# Patient Record
Sex: FEMALE | Race: OTHER | HISPANIC OR LATINO | URBAN - METROPOLITAN AREA
[De-identification: names, ages, dates, MRNs, and addresses within clinical notes are randomized per-mention and may not be internally consistent; named-entity substitution may affect disease eponyms.]

---

## 2023-01-14 ENCOUNTER — EMERGENCY (EMERGENCY)
Facility: HOSPITAL | Age: 23
LOS: 1 days | Discharge: HOME | End: 2023-01-14
Attending: PERSONAL EMERGENCY RESPONSE ATTENDANT
Payer: SELF-PAY

## 2023-01-14 VITALS
HEIGHT: 62 IN | DIASTOLIC BLOOD PRESSURE: 95 MMHG | OXYGEN SATURATION: 96 % | RESPIRATION RATE: 20 BRPM | TEMPERATURE: 98 F | WEIGHT: 160.06 LBS | SYSTOLIC BLOOD PRESSURE: 140 MMHG | HEART RATE: 114 BPM

## 2023-01-14 LAB
ALBUMIN SERPL ELPH-MCNC: 4.7 G/DL — SIGNIFICANT CHANGE UP (ref 3.3–5)
ALP SERPL-CCNC: 70 U/L — SIGNIFICANT CHANGE UP (ref 40–120)
ALT FLD-CCNC: 9 U/L — LOW (ref 10–45)
AMPHET UR-MCNC: NEGATIVE — SIGNIFICANT CHANGE UP
ANION GAP SERPL CALC-SCNC: 18 MMOL/L — HIGH (ref 5–17)
APAP SERPL-MCNC: <15 UG/ML — SIGNIFICANT CHANGE UP (ref 10–30)
APPEARANCE UR: ABNORMAL
AST SERPL-CCNC: 27 U/L — SIGNIFICANT CHANGE UP (ref 10–40)
BACTERIA # UR AUTO: ABNORMAL
BARBITURATES UR SCN-MCNC: NEGATIVE — SIGNIFICANT CHANGE UP
BASOPHILS # BLD AUTO: 0.02 K/UL — SIGNIFICANT CHANGE UP (ref 0–0.2)
BASOPHILS NFR BLD AUTO: 0.2 % — SIGNIFICANT CHANGE UP (ref 0–2)
BENZODIAZ UR-MCNC: NEGATIVE — SIGNIFICANT CHANGE UP
BILIRUB SERPL-MCNC: 0.3 MG/DL — SIGNIFICANT CHANGE UP (ref 0.2–1.2)
BILIRUB UR-MCNC: NEGATIVE — SIGNIFICANT CHANGE UP
BUN SERPL-MCNC: 11 MG/DL — SIGNIFICANT CHANGE UP (ref 7–23)
CALCIUM SERPL-MCNC: 9.8 MG/DL — SIGNIFICANT CHANGE UP (ref 8.4–10.5)
CHLORIDE SERPL-SCNC: 101 MMOL/L — SIGNIFICANT CHANGE UP (ref 96–108)
CO2 SERPL-SCNC: 15 MMOL/L — LOW (ref 22–31)
COCAINE METAB.OTHER UR-MCNC: NEGATIVE — SIGNIFICANT CHANGE UP
COLOR SPEC: YELLOW — SIGNIFICANT CHANGE UP
CREAT SERPL-MCNC: 0.59 MG/DL — SIGNIFICANT CHANGE UP (ref 0.5–1.3)
DIFF PNL FLD: ABNORMAL
EGFR: 131 ML/MIN/1.73M2 — SIGNIFICANT CHANGE UP
EOSINOPHIL # BLD AUTO: 0.01 K/UL — SIGNIFICANT CHANGE UP (ref 0–0.5)
EOSINOPHIL NFR BLD AUTO: 0.1 % — SIGNIFICANT CHANGE UP (ref 0–6)
EPI CELLS # UR: 7 /HPF — HIGH
ETHANOL SERPL-MCNC: <10 MG/DL — SIGNIFICANT CHANGE UP (ref 0–10)
GLUCOSE SERPL-MCNC: 96 MG/DL — SIGNIFICANT CHANGE UP (ref 70–99)
GLUCOSE UR QL: NEGATIVE — SIGNIFICANT CHANGE UP
HCG SERPL-ACNC: <2 MIU/ML — SIGNIFICANT CHANGE UP
HCT VFR BLD CALC: 42.9 % — SIGNIFICANT CHANGE UP (ref 34.5–45)
HGB BLD-MCNC: 14.4 G/DL — SIGNIFICANT CHANGE UP (ref 11.5–15.5)
HYALINE CASTS # UR AUTO: 12 /LPF — HIGH (ref 0–2)
IMM GRANULOCYTES NFR BLD AUTO: 0.2 % — SIGNIFICANT CHANGE UP (ref 0–0.9)
KETONES UR-MCNC: ABNORMAL
LEUKOCYTE ESTERASE UR-ACNC: ABNORMAL
LYMPHOCYTES # BLD AUTO: 25.8 % — SIGNIFICANT CHANGE UP (ref 13–44)
LYMPHOCYTES # BLD AUTO: 3.24 K/UL — SIGNIFICANT CHANGE UP (ref 1–3.3)
MAGNESIUM SERPL-MCNC: 2.1 MG/DL — SIGNIFICANT CHANGE UP (ref 1.6–2.6)
MCHC RBC-ENTMCNC: 28.6 PG — SIGNIFICANT CHANGE UP (ref 27–34)
MCHC RBC-ENTMCNC: 33.6 GM/DL — SIGNIFICANT CHANGE UP (ref 32–36)
MCV RBC AUTO: 85.3 FL — SIGNIFICANT CHANGE UP (ref 80–100)
METHADONE UR-MCNC: NEGATIVE — SIGNIFICANT CHANGE UP
MONOCYTES # BLD AUTO: 0.66 K/UL — SIGNIFICANT CHANGE UP (ref 0–0.9)
MONOCYTES NFR BLD AUTO: 5.3 % — SIGNIFICANT CHANGE UP (ref 2–14)
NEUTROPHILS # BLD AUTO: 8.6 K/UL — HIGH (ref 1.8–7.4)
NEUTROPHILS NFR BLD AUTO: 68.4 % — SIGNIFICANT CHANGE UP (ref 43–77)
NITRITE UR-MCNC: NEGATIVE — SIGNIFICANT CHANGE UP
NRBC # BLD: 0 /100 WBCS — SIGNIFICANT CHANGE UP (ref 0–0)
OPIATES UR-MCNC: NEGATIVE — SIGNIFICANT CHANGE UP
OXYCODONE UR-MCNC: NEGATIVE — SIGNIFICANT CHANGE UP
PCP SPEC-MCNC: SIGNIFICANT CHANGE UP
PCP UR-MCNC: NEGATIVE — SIGNIFICANT CHANGE UP
PH UR: 5.5 — SIGNIFICANT CHANGE UP (ref 5–8)
PLATELET # BLD AUTO: 304 K/UL — SIGNIFICANT CHANGE UP (ref 150–400)
POTASSIUM SERPL-MCNC: 4.1 MMOL/L — SIGNIFICANT CHANGE UP (ref 3.5–5.3)
POTASSIUM SERPL-SCNC: 4.1 MMOL/L — SIGNIFICANT CHANGE UP (ref 3.5–5.3)
PROT SERPL-MCNC: 8.1 G/DL — SIGNIFICANT CHANGE UP (ref 6–8.3)
PROT UR-MCNC: ABNORMAL
RBC # BLD: 5.03 M/UL — SIGNIFICANT CHANGE UP (ref 3.8–5.2)
RBC # FLD: 12.3 % — SIGNIFICANT CHANGE UP (ref 10.3–14.5)
RBC CASTS # UR COMP ASSIST: 13 /HPF — HIGH (ref 0–4)
SALICYLATES SERPL-MCNC: <2 MG/DL — LOW (ref 15–30)
SODIUM SERPL-SCNC: 134 MMOL/L — LOW (ref 135–145)
SP GR SPEC: 1.02 — SIGNIFICANT CHANGE UP (ref 1.01–1.02)
THC UR QL: POSITIVE
UROBILINOGEN FLD QL: NEGATIVE — SIGNIFICANT CHANGE UP
WBC # BLD: 12.56 K/UL — HIGH (ref 3.8–10.5)
WBC # FLD AUTO: 12.56 K/UL — HIGH (ref 3.8–10.5)
WBC UR QL: 83 /HPF — HIGH (ref 0–5)

## 2023-01-14 PROCEDURE — 99212 OFFICE O/P EST SF 10 MIN: CPT

## 2023-01-14 PROCEDURE — 99285 EMERGENCY DEPT VISIT HI MDM: CPT

## 2023-01-14 RX ORDER — DIPHENHYDRAMINE HCL 50 MG
50 CAPSULE ORAL ONCE
Refills: 0 | Status: COMPLETED | OUTPATIENT
Start: 2023-01-14 | End: 2023-01-14

## 2023-01-14 RX ORDER — HALOPERIDOL DECANOATE 100 MG/ML
5 INJECTION INTRAMUSCULAR ONCE
Refills: 0 | Status: COMPLETED | OUTPATIENT
Start: 2023-01-14 | End: 2023-01-14

## 2023-01-14 RX ADMIN — Medication 2 MILLIGRAM(S): at 21:18

## 2023-01-14 RX ADMIN — HALOPERIDOL DECANOATE 5 MILLIGRAM(S): 100 INJECTION INTRAMUSCULAR at 21:18

## 2023-01-14 NOTE — ED PROVIDER NOTE - OBJECTIVE STATEMENT
Attending MD Magallanes.  Agree with above.  PT is a 21 yo fem presenting to ED stating that god brought her to ED.  Pt endorses being followed by 'people'.  Pt refusing to answer questions in ED initially then splitting and willing to answer certain staff.  Becomes easily paranoid and accuses us of being in on it.  Pt with bizarre responses to questions, occasionally giving numeric numbers to questions that do not warrant numeric responses.  Pt also standing in doorway of room making racist/aggressive comments to passersby and accusing people of ignoring her who have not seen her.  Unclear baseline dxes.  Pt endorses previous (psych?) admission to Attending MD Magallanes.  Agree with above.  PT is a 23 yo fem presenting to ED stating that god brought her to ED.  Pt endorses being followed by 'people'.  Pt refusing to answer questions in ED initially then splitting and willing to answer certain staff.  Becomes easily paranoid and accuses us of being in on it.  Pt with bizarre responses to questions, occasionally giving numeric numbers to questions that do not warrant numeric responses.  Pt also standing in doorway of room making racist/aggressive comments to passersby and accusing people of ignoring her who have not seen her.  Unclear baseline dxes.  Pt endorses previous (psych?) admission to [facility] in 2018 but denies hx since that time.  Denies taking routine meds, denies recent desire for self harm/SI.  Denies past hx of self-harm attempts/SI/HI.  Pt is disorganized, paranoid and bizzarre with perseveration re: persecution by everyone she encounters.

## 2023-01-14 NOTE — ED ADULT TRIAGE NOTE - CHIEF COMPLAINT QUOTE
As per cousin pt is hearing voices, talks to herself a lot and been aggressive a lot at home, no SI or HI

## 2023-01-14 NOTE — ED PROVIDER NOTE - CLINICAL SUMMARY MEDICAL DECISION MAKING FREE TEXT BOX
Attending MD Magallanes.  Agree with above.  Initial documentation completed by me with resident and myself providing care in real time.  Pt presents with complaint of 'not feeling safe' and needing to 'save the world' because she 'loves the earth'.  States she loves 'animals and plants'.  Pt unwilling to sit still/allow labs or communicate. Pt pacing.  Ativan/haldol administered to promote pt cooperation for pt and staff safety.  Pt tolerated well.  Labs drawn.  Planned psych consult once alcohol level returns.  Stable at time of signout pending above. Attending MD Magallanes.  Agree with above.  Initial documentation completed by me with resident and myself providing care in real time.  Pt presents with complaint of 'not feeling safe' and needing to 'save the world' because she 'loves the earth'.  States she loves 'animals and plants'.  Pt unwilling to sit still/allow labs or communicate. Pt pacing.  Ativan/haldol administered to promote pt cooperation for pt and staff safety.  Pt tolerated well.  Labs drawn.  Planned psych consult once alcohol level returns.  Stable at time of sign-out pending above.

## 2023-01-14 NOTE — ED PROVIDER NOTE - ATTENDING CONTRIBUTION TO CARE
Attending MD Magallanes:  I performed a history and physical exam of the patient and discussed their management with the resident. I reviewed the resident's note and agree with the documented findings and plan of care. My medical decision making and observations are found above.
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
## 2023-01-14 NOTE — ED ADULT NURSE NOTE - ACTIVATING EVENTS/STRESSORS
Patient Education     Viral Syndrome (Adult)  A viral illness may cause a number of symptoms such as fever. Other symptoms depend on the part of the body that the virus affects. If it settles in your nose, throat, and lungs, it may cause cough, sore throat, congestion, runny nose, headache, earache and other ear symptoms, or shortness of breath. If it settles in your stomach and intestinal tract, it may cause nausea, vomiting, cramping, and diarrhea. Sometimes it causes generalized symptoms like \"aching all over,\" feeling tired, loss of energy, or loss of appetite.  A viral illness usually lasts anywhere from several days to several weeks, but sometimes it lasts longer. In some cases, a more serious infection can look like a viral syndrome in the first few days of the illness. You may need another exam and additional tests to know the difference. Watch for the warning signs listed below for when to seek medical advice.  Home care  Follow these guidelines for taking care of yourself at home:  · If symptoms are severe, rest at home for the first 2 to 3 days.  · Stay away from cigarette smoke - both your smoke and the smoke from others.  · You may use over-the-counter acetaminophen or ibuprofen for fever, muscle aching, and headache, unless another medicine was prescribed for this. If you have chronic liver or kidney disease or ever had a stomach ulcer or gastrointestinal bleeding, talk with your healthcare provider before using these medicines. No one who is younger than 18 and ill with a fever should take aspirin. It may cause severe disease or death.  · Your appetite may be poor, so a light diet is fine. Avoid dehydration by drinking 8 to 12, 8-ounce glasses of fluids each day. This may include water; orange juice; lemonade; apple, grape, and cranberry juice; clear fruit drinks; electrolyte replacement and sports drinks; and decaffeinated teas and coffee. If you have been diagnosed with a kidney disease, ask your  healthcare provider how much and what types of fluids you should drink to prevent dehydration. If you have kidney disease, drinking too much fluid can cause it build up in the your body and be dangerous to your health.  · Over-the-counter remedies won't shorten the length of the illness but may be helpful for symptoms such as cough, sore throat, nasal and sinus congestion, or diarrhea. Don't use decongestants if you have high blood pressure.  Follow-up care  Follow up with your healthcare provider if you do not improve over the next week.  Call 911  Call 911 if any of the following occur:  · Convulsion  · Feeling weak, dizzy, or like you are going to faint  · Chest pain, or more than mild shortness of breath   When to seek medical advice  Call your healthcare provider right away if any of these occur:  · Cough with lots of colored sputum (mucus) or blood in your sputum  · Chest pain, shortness of breath, wheezing, or trouble breathing  · Severe headache; face, neck, or ear pain  · Severe, constant pain in the lower right side of your belly (abdominal)  · Continued vomiting (can’t keep liquids down)  · Frequent diarrhea (more than 5 times a day); blood (red or black color) or mucus in diarrhea  · Feeling weak, dizzy, or like you are going to faint  · Extreme thirst  · Fever of 100.4°F (38°C) or higher, or as directed by your healthcare provider  Date Last Reviewed: 4/1/2018  © 2442-1956 [employer]. [address] [place] [address]. All rights reserved. This information is not intended as a substitute for professional medical care. Always follow your healthcare professional's instructions.         Brat Diet    Treatment of nausea, vomiting and/or diarrhea      Nausea is a sick queasy feeling in the stomach.  When you are nauseated you may feel like you have to vomit or have actual vomiting of foodstuff contents of the gastrointestinal system.  They are symptoms of some underlying process  frequently related to diseases of the gastrointestinal system, which may be caused by viruses, food poisoning, medications, alcohol, anxiety and pregnancy.  In addition, nausea may be a sign of an upper respiratory illness with a post-nasal drip.    Diarrhea is a symptom of gastrointestinal disease resulting in loose, watery often frequent bowel movements.  It may be acute, beginning suddenly and resolving over a few days with dietary changes, or of a chronic ongoing process.  Causes of this symptom are similar to the ones listed for nausea and vomiting.    BRAT stands for Bananas, Rice, Apples and Toast.    Treatment:  A short-term gastrointestinal (stomach or bowel) illness requires a change in your diet.    For Nausea and/or vomiting:  First 24 hours: (Day One) Gradually add clear liquids if the vomiting has ceased.  Beginning with a sip or two every ten minutes is a good way to start.  Suggestions include Gatorade, Propel, Pedialyte, water, apple juice, flat soda, weak tea, jello (in liquid or gelatin form), broth or bouillon (Clear based from non-greasy soup).  If symptoms of nausea or vomiting return, begin the process again, taking nothing by mouth for an hour or so.    (Day Two) - Begin to add bland foods like bananas, rice applesauce, cracker, cooked cereals, ([place], Cream of Wheat), toast and jelly.    (Day Three) - Progress to add \"regular\" diet by adding such things as soft cooked eggs, sherbet, stewed fruits , cooked vegetables, white meat of chicken or turkey.    What foods to avoid:  Avoid milk and dairy products for 7 days.  Avoid fried, fatty, greasy and spicy foods.  Avoid pork, veal, salmon and sardines.  Avoid raw vegetables such as parsnips, beets, sauerkraut, corn on the cob, cabbage family, onions.  Avoid citrus fruits:  Pineapples, oranges, grapefruits, tomatoes.  Other fruits to avoid are cherries, grapes, figs, currants, raisins, rhubarb, seeded berries.  Avoid extremely hot or cold  beverages.  Avoid alcohol.  Avoid coffee and caffeinated sodas.  Drink plenty of water or liquids to avoid dehydration from fluid losses due to your illness.    Rest and avoid exertion to give your body a chance to recover.    Make an appointment if you are not getting better with the diet changes after 24 hours, if you have a problem with chronic diarrhea or if you have additional symptoms of fever, weight loss, lightheadedness (feeling of faintness), rectal bleeding or abdominal pain.       Non-compliant or not receiving treatment

## 2023-01-14 NOTE — ED PROVIDER NOTE - CARE PLAN
1 Principal Discharge DX:	Psychiatric disturbance   Principal Discharge DX:	Psychiatric disturbance  Secondary Diagnosis:	Acute UTI

## 2023-01-14 NOTE — ED PROVIDER NOTE - PROGRESS NOTE DETAILS
Jurgen Haider MD (PGY-3): Pt has been calm and cooperative since receiving haldol and ativan. Spoke to tele-psych, who will see pt. u/a shows that pt has Jurgen Haider MD (PGY-3): Pt has been calm and cooperative since receiving haldol and ativan. Spoke to tele-psych, who will see pt. u/a shows that pt has UTI, so pt was given PO keflex. Sanjiv Ferreira MD: seen by Tele-psych, plan for 2-PC psychiatric admission. pt reassessed and sleeping comfortably, awakens to voice, nonfocal neuro exam. pt found to have UTI, given PO atbx. Martinez Rosado MD: Final disposition as per psychiatry's for inpatient involuntary hold.  We will complete 2 PC paperwork. Jony Collins, PGY-3: Pt reexamined at bedside, resting comfortably, vitals stable. Patient has been calm and cooperative while in the emergency department during my shift.  As per psychiatry patient will be admitted to hospital, bed may be available at [facility].  This was discussed with mother over the phone.  Mother is in agreement. FRANKLIN Valadez MD: Rec'd signout on this patient from previous attending. Pt has been 2PC-ed, has not had any acute events during my shift, is awaiting psychiatric placement. Attending (Walt Black D.O.):  Patient was signed out to me hemodynamically stable.  No events overnight that required medication.  Patient this morning, after discussion with primary RN as well as one-to-one, with increasing agitation, pacing, delusions/hallucinations.  Patient was verbally threatening behavior to staff.  Offered p.o. meds however declined.  Will provide intramuscular medication for agitated anxiety control at this time ap- pt signed out to me is currently ambulatory in the department w/ steady gait, pending bed placement remains on constant observation, 2PC for psychosis, paranoia labile mood, UA rx'd w/ keflex, will continue medication and to start risperidal per psych recs Mauricio Todd PGY-3 Patient signed out to me currently waiting for bed placement to PCU for psychotic behavior has not needed any sedative meds overnight.  has been sleeping during my shift ap- pt signed out to me pending placement continues to be on a 2PC, for psychosis, paranoias and labile mood, pt continueing keflex, she has no complaints resting in bed currently, on one to one observation, denies any ah/vh/si/hi  Arsenio: Recd sign out from Dr. Gerardo, pt p/w delusions, 2PC paperwork filled out, awaiting psych bed

## 2023-01-15 DIAGNOSIS — F29 UNSPECIFIED PSYCHOSIS NOT DUE TO A SUBSTANCE OR KNOWN PHYSIOLOGICAL CONDITION: ICD-10-CM

## 2023-01-15 DIAGNOSIS — F43.10 POST-TRAUMATIC STRESS DISORDER, UNSPECIFIED: ICD-10-CM

## 2023-01-15 DIAGNOSIS — F12.90 CANNABIS USE, UNSPECIFIED, UNCOMPLICATED: ICD-10-CM

## 2023-01-15 LAB
FLUAV AG NPH QL: SIGNIFICANT CHANGE UP
FLUBV AG NPH QL: SIGNIFICANT CHANGE UP
RSV RNA NPH QL NAA+NON-PROBE: SIGNIFICANT CHANGE UP
SARS-COV-2 RNA SPEC QL NAA+PROBE: SIGNIFICANT CHANGE UP
TSH SERPL-MCNC: 2.08 UIU/ML — SIGNIFICANT CHANGE UP (ref 0.27–4.2)

## 2023-01-15 PROCEDURE — 90792 PSYCH DIAG EVAL W/MED SRVCS: CPT | Mod: 95

## 2023-01-15 RX ORDER — CEPHALEXIN 500 MG
250 CAPSULE ORAL ONCE
Refills: 0 | Status: COMPLETED | OUTPATIENT
Start: 2023-01-15 | End: 2023-01-15

## 2023-01-15 RX ADMIN — Medication 250 MILLIGRAM(S): at 03:39

## 2023-01-15 NOTE — ED BEHAVIORAL HEALTH ASSESSMENT NOTE - DIFFERENTIAL
Psychosis, unspecified vs. BPAD with psychotic features  Cannabis Use Disorder  r/o complex PTSD  r/o substance induced psychosis

## 2023-01-15 NOTE — CHART NOTE - NSCHARTNOTEFT_GEN_A_CORE
Weekend ED  received and appreciated referral to patient for inpatient psychiatry placement.  discussed case with psychiatry. Patient is 2PC'd for inpatient psychiatry placement for paranoia and agitation.      met with patient at bedside. Patient is a 22 year old English speaking female. Patient resides with her aunt, Mere Garduno (p: [phone]), her brother, and her cousin. Patient appointed her aunt as her best emergency contact. Patient confirmed she is uninsured and eligible only for Medicaid for emergency services at present time.  informed Adrien from financial counseling of the same.     initiated packet for inpatient psychiatry referral and contacted Janet at [facility] (no beds), Pat at [facility] (no beds), Bob at [facility] (no beds), Olivia at [facility] (no beds), Taylor at [facility] (will review packet for possible bed tomorrow), and Atiya at [facility] (will review packet for possible bed tomorrow).  e-faxed referrals to [facility] and [facility]  to follow up for acceptance and placement.      remains available for support and discharge planning needs/ placement.
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
## 2023-01-15 NOTE — ED BEHAVIORAL HEALTH PROGRESS NOTE - RISK ASSESSMENT
risk: prior hospitalization, paranoia with persecutory elements, aggressive behavior, no outpatient treatment, poor insight and judgment  protective: family support risk: prior hospitalization, paranoia with persecutory elements, aggressive behavior, no outpatient treatment, poor insight and judgment, trauma history  protective: family support, no prior history of suicide attempt, no history of violence

## 2023-01-15 NOTE — ED BEHAVIORAL HEALTH NOTE - BEHAVIORAL HEALTH NOTE
==================  PRE-HOSPITAL COURSE  ===================  SOURCE:  RN and secondhand ED documentation  DETAILS: PT is a 21 yo fem presenting to ED stating that god brought her to ED.  Pt endorses being followed by 'people'.  Pt refusing to answer questions in ED initially then splitting and willing to answer certain staff.  =========  ED COURSE  =========  SOURCE:  RN Leopoldo and secondhand ED documentation.  ARRIVAL:  Per chart and RN, patient arrived via walk in accompanied by cousin. Per RN, patient was irritable upon arrival, and cooperative with triage process.  BELONGINGS:  Per RN, patient arrived with belongings. All belongings were provided to hospital security, and patient currently in a gown with a 1:1 staff member.  BEHAVIOR: RN described patient to be labile, paranoid, verbally aggressive, screaming at staff, yelling racial slurs at , required medication, now calm and cooperative, presenting with disorganized thought process, AAOx3, presenting with irritable mood and congruent affect, remains in tenuous behavioral and impulse control, is not currently violent/aggressive. RN stated that the patient endorsed AH then retracted when cousin entered room, now denying SI/HI/A/VH. RN stated that there are no visible marks, bruises, or lacerations on the body. RN stated that the patient appears to be well-groomed, maintains fair hygiene, and reports fair ADLs, ambulates without assistance.  TREATMENT:  Per chart and RN, patient received PRN medications: IM Haldol 5mg, Ativan 2mg.   VISITORS:  Per RN, no visitors at bedside.         COVID Exposure Screen- collateral (i.e. third-party, chart review, belongings, etc; include EMS and ED staff)   ---------------------------------------------------  1. Has the patient had a COVID-19 test in the last 90 days? Unknown.   2. Has the patient tested positive for COVID-19 antibodies? Unknown.   3. Has the patient received 2 doses of the COVID-19 vaccine? Unknown  4. In the past 10 days, has the patient been around anyone with a positive COVID-19 test?* Unknown.   5. Has the patient been out of [place] within the past 10 days? Unknown
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
## 2023-01-15 NOTE — ED BEHAVIORAL HEALTH NOTE - BEHAVIORAL HEALTH NOTE
========================  FOR EACH COLLATERAL  ========================  Collateral below has requested that the information provided remain confidential: Yes [  ] No [ X ]    Collateral below has provided information that patient is/may be unaware of: Yes [  ] No [ X ]    Patient gives permission to obtain collateral from _____:  ( X ) Yes  (  )  No  Rationale for overriding objection            (  ) Lack of capacity. Details: ________            (  ) Assessing risk of danger to self/others. Details: ________  Rationale for selecting specific collateral source            (  ) Potential to impact risk of danger to self/others and no alternative equivalent. Details: _____    NAME: Clinton Mittal     NUMBER: [phone]    RELATIONSHIP: Cousin.    RELIABILITY: Unreliable.    COMMENTS: [facility] attempted to contact collateral without success.  was left with a call back number.
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
## 2023-01-15 NOTE — ED BEHAVIORAL HEALTH ASSESSMENT NOTE - RISK ASSESSMENT
risk: prior hospitalization, paranoia with persecutory elements, aggressive behavior, no outpatient treatment, poor insight and judgment  protective: family support

## 2023-01-15 NOTE — ED BEHAVIORAL HEALTH ASSESSMENT NOTE - DESCRIPTION
Upon arrival to the ED, the patient was labile, at one moment calm, and at the next screaming. She has been paranoid and making racist remarks towards staff members. The patient required IM Haldol 5mg and Ativan 2mg. None patient is from [place], moved to the  at age 12. Her mother  when she was 6. The patient planning on going back to school in March.

## 2023-01-15 NOTE — ED BEHAVIORAL HEALTH ASSESSMENT NOTE - OTHER PAST PSYCHIATRIC HISTORY (INCLUDE DETAILS REGARDING ONSET, COURSE OF ILLNESS, INPATIENT/OUTPATIENT TREATMENT)
1 prior psychiatric hospitalization at [facility] vs. [place] (patient unsure of details).  No outpatient treatment.
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
## 2023-01-16 RX ORDER — RISPERIDONE 4 MG/1
0.5 TABLET ORAL ONCE
Refills: 0 | Status: COMPLETED | OUTPATIENT
Start: 2023-01-16 | End: 2023-01-16

## 2023-01-16 RX ORDER — HALOPERIDOL DECANOATE 100 MG/ML
5 INJECTION INTRAMUSCULAR ONCE
Refills: 0 | Status: COMPLETED | OUTPATIENT
Start: 2023-01-16 | End: 2023-01-16

## 2023-01-16 RX ORDER — CEPHALEXIN 500 MG
500 CAPSULE ORAL EVERY 6 HOURS
Refills: 0 | Status: DISCONTINUED | OUTPATIENT
Start: 2023-01-16 | End: 2023-01-18

## 2023-01-16 RX ORDER — DIPHENHYDRAMINE HCL 50 MG
50 CAPSULE ORAL ONCE
Refills: 0 | Status: COMPLETED | OUTPATIENT
Start: 2023-01-16 | End: 2023-01-16

## 2023-01-16 RX ADMIN — RISPERIDONE 0.5 MILLIGRAM(S): 4 TABLET ORAL at 16:29

## 2023-01-16 RX ADMIN — HALOPERIDOL DECANOATE 5 MILLIGRAM(S): 100 INJECTION INTRAMUSCULAR at 09:44

## 2023-01-16 RX ADMIN — Medication 500 MILLIGRAM(S): at 16:30

## 2023-01-16 RX ADMIN — Medication 2 MILLIGRAM(S): at 09:44

## 2023-01-16 RX ADMIN — Medication 50 MILLIGRAM(S): at 09:44

## 2023-01-16 NOTE — ED BEHAVIORAL HEALTH PROGRESS NOTE - SUMMARY
Assessed by telepsych overnight and deemed to a danger to herself and others requiring involuntary admission

## 2023-01-16 NOTE — ED BEHAVIORAL HEALTH NOTE - BEHAVIORAL HEALTH NOTE
=========  REASSESSMENT  =========	  SOURCE:  RN Gwyn and secondhand ED documentation.  BEHAVIOR: RN described patient to be currently sleeping, otherwise calm and cooperative. No acute issues. No episodes of agitation or aggression.   TREATMENT:  Per chart and RN, patient did not receive PRN medications.   VISITORS:  Per RN, no visitors at bedside.

## 2023-01-16 NOTE — ED BEHAVIORAL HEALTH PROGRESS NOTE - UNABLE TO CARE FOR SELF DETAILS
agitated, poor sleep, poor eating, erratic and aggressive behavior, acute change from baseline, poor insight
agitated, poor sleep, poor eating, erratic and aggressive behavior, acute change from baseline, poor insight

## 2023-01-16 NOTE — ED BEHAVIORAL HEALTH PROGRESS NOTE - RISK ASSESSMENT
risk: prior hospitalization, paranoia with persecutory elements, aggressive behavior, no outpatient treatment, poor insight and judgment, trauma history  protective: family support, no prior history of suicide attempt, no history of violence no

## 2023-01-16 NOTE — ED BEHAVIORAL HEALTH PROGRESS NOTE - COLLATERAL INFORMATION (NAME, PHONE, RELATIONSHIP):
see note from yesterday
spoke with cousin Clinton and Aunt ([phone])  Family shared that the patient's behavior has been increasingly erratic at home for the last month. She has been agitated and unpredictable and increasingly hostile with family and strangers. Aunt thinks that she has been smoking weed daily. They also shared that she has not been sleeping at home and has been paranoid about people following her. She was also observed talking to herself. Aunt is recovering from surgery and she is concerned about the patient's safety if she returns home. Does not feel that she is able to take care of herself right now.

## 2023-01-17 PROCEDURE — 99212 OFFICE O/P EST SF 10 MIN: CPT

## 2023-01-17 RX ORDER — RISPERIDONE 4 MG/1
0.5 TABLET ORAL ONCE
Refills: 0 | Status: COMPLETED | OUTPATIENT
Start: 2023-01-17 | End: 2023-01-17

## 2023-01-17 RX ORDER — RISPERIDONE 4 MG/1
0.5 TABLET ORAL
Refills: 0 | Status: DISCONTINUED | OUTPATIENT
Start: 2023-01-17 | End: 2023-01-18

## 2023-01-17 RX ADMIN — RISPERIDONE 0.5 MILLIGRAM(S): 4 TABLET ORAL at 19:33

## 2023-01-17 RX ADMIN — Medication 500 MILLIGRAM(S): at 22:04

## 2023-01-17 RX ADMIN — Medication 500 MILLIGRAM(S): at 08:27

## 2023-01-17 RX ADMIN — Medication 500 MILLIGRAM(S): at 16:34

## 2023-01-17 RX ADMIN — RISPERIDONE 0.5 MILLIGRAM(S): 4 TABLET ORAL at 08:33

## 2023-01-17 RX ADMIN — Medication 500 MILLIGRAM(S): at 01:50

## 2023-01-17 NOTE — ED BEHAVIORAL HEALTH NOTE - BEHAVIORAL HEALTH NOTE
Per covering RN/secondhand ED documenation patient remains gowned and on 1:1 in hallway area; remained asleep overnight, no SI/HI/AH/VH elicited. Patient unaccompanied by social supports at this time. Patient pending psychiatric admission.

## 2023-01-17 NOTE — ED BEHAVIORAL HEALTH PROGRESS NOTE - RISK ASSESSMENT
risk: prior hospitalization, paranoia with persecutory elements, aggressive behavior, no outpatient treatment, poor insight and judgment, trauma history  protective: family support, no prior history of suicide attempt, no history of violence

## 2023-01-17 NOTE — ED BEHAVIORAL HEALTH PROGRESS NOTE - SOURCES CURRRENT EVALUATION
Patient Interview/Medical Record Reviewed...
Patient Interview/Collateral (personal, professional, ED staff, etc.).../Medical Record Reviewed...
Patient Interview/Collateral (personal, professional, ED staff, etc.).../Medical Record Reviewed...

## 2023-01-17 NOTE — ED BEHAVIORAL HEALTH PROGRESS NOTE - MEDICAL WORKUP/TREATMENT SINCE LAST ASSESSMENT DETAIL FREE TEXT
T(C): 36.8 (17 Jan 2023 11:02), Max: 37.2 (16 Jan 2023 16:26) T(F): 98.2 (17 Jan 2023 11:02), Max: 98.9 (16 Jan 2023 16:26)  HR: 98 (17 Jan 2023 11:02) (88 - 106)  BP: 119/80 (17 Jan 2023 11:02) (94/60 - 119/80)  RR: 17 (17 Jan 2023 11:02) (17 - 18) SpO2: 98% (17 Jan 2023 11:02) (97% - 99%)
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
## 2023-01-17 NOTE — ED BEHAVIORAL HEALTH PROGRESS NOTE - BILLING CODES
Billing in another system
99281-Emergency department visit - straightforward complexity
Billing in another system

## 2023-01-17 NOTE — ED BEHAVIORAL HEALTH PROGRESS NOTE - DETAILS:
Seen today for follow up and reassessment. During interview patient is cooperative, calm, labile. She minimizes recent behavior stating that she has just been feeling anxious. Denies paranoia or AVH. Denies SIIP/HIIP. States that she has been sleeping well but endorses a poor appetite and recent 40 pound unintentional weight loss. 
Patient seen and evaluated, chart, labs, meds reviewed, prior evals appreciated. She does not appear to respond to internal stimuli today, in better behavioral control and not requiring PRNs overnight. Patient on initial evaluation psychotic, awaiting transfer to in psychiatric facility pending bed availability. endorses a poor appetite and recent 40 pound unintentional weight loss. Started on Risperdal, appears with good effect.
pt continues to respond to internal stimuli, attempting to wander off unit, requiring haldol/ativan/benadryl for agitation and unpredictable behaviors. pt psychotic, awaiting transfer to in psychiatric facility pending bed availability. endorses a poor appetite and recent 40 pound unintentional weight loss.

## 2023-01-17 NOTE — ED BEHAVIORAL HEALTH PROGRESS NOTE - NSICDXBHSECONDARYDX_PSY_ALL_CORE
Cannabis use disorder   F12.90  PTSD (post-traumatic stress disorder)   F43.10  

## 2023-01-17 NOTE — ED BEHAVIORAL HEALTH PROGRESS NOTE - SUMMARY
Assessed by telepsych initially and deemed to a danger to herself and others requiring involuntary admission

## 2023-01-17 NOTE — ED BEHAVIORAL HEALTH PROGRESS NOTE - NSICDXBHPRIMARYDX_PSY_ALL_CORE
Psychosis, unspecified psychosis type   F29  

## 2023-01-17 NOTE — ED BEHAVIORAL HEALTH PROGRESS NOTE - BED AVAILABILITY
Lack of inpatient Psychiatry bed...

## 2023-01-17 NOTE — ED BEHAVIORAL HEALTH PROGRESS NOTE - NS ED BHA PLAN
Admit/Transfer to Inpatient Psychiatry

## 2023-01-17 NOTE — ED BEHAVIORAL HEALTH PROGRESS NOTE - PSYCHIATRIC ISSUES AND PLAN (INCLUDE STANDING AND PRN MEDICATION)
Haldol 5mg, Ativan 2mg, Benadryl 50mg PO or IM q6h PRN for agitation

## 2023-01-18 VITALS
SYSTOLIC BLOOD PRESSURE: 114 MMHG | HEART RATE: 100 BPM | RESPIRATION RATE: 18 BRPM | TEMPERATURE: 98 F | DIASTOLIC BLOOD PRESSURE: 84 MMHG | OXYGEN SATURATION: 98 %

## 2023-01-18 LAB — SARS-COV-2 RNA SPEC QL NAA+PROBE: SIGNIFICANT CHANGE UP

## 2023-01-18 PROCEDURE — 99285 EMERGENCY DEPT VISIT HI MDM: CPT

## 2023-01-18 PROCEDURE — 84443 ASSAY THYROID STIM HORMONE: CPT

## 2023-01-18 PROCEDURE — 85025 COMPLETE CBC W/AUTO DIFF WBC: CPT

## 2023-01-18 PROCEDURE — 96372 THER/PROPH/DIAG INJ SC/IM: CPT

## 2023-01-18 PROCEDURE — 84702 CHORIONIC GONADOTROPIN TEST: CPT

## 2023-01-18 PROCEDURE — 93005 ELECTROCARDIOGRAM TRACING: CPT

## 2023-01-18 PROCEDURE — 87637 SARSCOV2&INF A&B&RSV AMP PRB: CPT

## 2023-01-18 PROCEDURE — 83735 ASSAY OF MAGNESIUM: CPT

## 2023-01-18 PROCEDURE — 36415 COLL VENOUS BLD VENIPUNCTURE: CPT

## 2023-01-18 PROCEDURE — 80307 DRUG TEST PRSMV CHEM ANLYZR: CPT

## 2023-01-18 PROCEDURE — 80053 COMPREHEN METABOLIC PANEL: CPT

## 2023-01-18 PROCEDURE — 81001 URINALYSIS AUTO W/SCOPE: CPT

## 2023-01-18 PROCEDURE — U0003: CPT

## 2023-01-18 RX ADMIN — RISPERIDONE 0.5 MILLIGRAM(S): 4 TABLET ORAL at 08:50

## 2023-01-18 RX ADMIN — Medication 500 MILLIGRAM(S): at 05:12

## 2023-01-18 RX ADMIN — Medication 500 MILLIGRAM(S): at 09:02

## 2023-01-18 NOTE — ED ADULT NURSE REASSESSMENT NOTE - NS ED NURSE REASSESS COMMENT FT1
Pt is demonstrating gains, still oddly related and childlike but more organized, compliant with meds, VSS, ate breakfast, CO in place.
Pt woke up at 0800, ate breakfast, was superficially bright and childlike but in control; however, after an hour she became increasingly restless and harder to redirect, kept wandering away from bed and approaching exits, more aggressive; haldol 5 ativan 2 and Benadryl 50 given IM without incident, though pt put her head on writer's arm and began stroking his hand during administration; good effect noted, CO in place, VSS.
Report received from Leopoldo THOMAS in [place]. Pt is resting comfortably. 1:1 in place. Awaiting dispo.
Report received from WILLIAM Carrion in [place]. Pt placed in room 28, completely undressed, wanded by Security, belongings given to cousin who accompanied pt to [facility]. one to one ordered, pending MD butts
observed pt. sleeping most of the shift and behavior calm. 1:1 CO for aggression/psychosis maintained.
pt set up with tele psych. Pt calm and cooperative.
received pt. sleeping and calm. awaiting transfer pending bed psychiatric placement. 1:1 CO for psychosis/agitation maintained.
received pt. sleeping, but easily awakened for her meds, 1:1 CO for aggression/psychosis maintained.
appropriate

## 2024-01-10 ENCOUNTER — INPATIENT (INPATIENT)
Facility: HOSPITAL | Age: 24
LOS: 6 days | Discharge: HOME | DRG: 881 | End: 2024-01-17
Attending: STUDENT IN AN ORGANIZED HEALTH CARE EDUCATION/TRAINING PROGRAM | Admitting: HOSPITALIST
Payer: MEDICAID

## 2024-01-10 VITALS
SYSTOLIC BLOOD PRESSURE: 123 MMHG | WEIGHT: 175.05 LBS | TEMPERATURE: 98 F | RESPIRATION RATE: 18 BRPM | OXYGEN SATURATION: 98 % | HEART RATE: 83 BPM | HEIGHT: 61 IN | DIASTOLIC BLOOD PRESSURE: 84 MMHG

## 2024-01-10 DIAGNOSIS — F29 UNSPECIFIED PSYCHOSIS NOT DUE TO A SUBSTANCE OR KNOWN PHYSIOLOGICAL CONDITION: ICD-10-CM

## 2024-01-10 DIAGNOSIS — R45.89 OTHER SYMPTOMS AND SIGNS INVOLVING EMOTIONAL STATE: ICD-10-CM

## 2024-01-10 DIAGNOSIS — F43.22 ADJUSTMENT DISORDER WITH ANXIETY: ICD-10-CM

## 2024-01-10 LAB
ALBUMIN SERPL ELPH-MCNC: 4.6 G/DL — SIGNIFICANT CHANGE UP (ref 3.3–5)
ALBUMIN SERPL ELPH-MCNC: 4.6 G/DL — SIGNIFICANT CHANGE UP (ref 3.3–5)
ALP SERPL-CCNC: 63 U/L — SIGNIFICANT CHANGE UP (ref 40–120)
ALP SERPL-CCNC: 63 U/L — SIGNIFICANT CHANGE UP (ref 40–120)
ALT FLD-CCNC: 10 U/L — SIGNIFICANT CHANGE UP (ref 10–45)
ALT FLD-CCNC: 10 U/L — SIGNIFICANT CHANGE UP (ref 10–45)
AMPHET UR-MCNC: NEGATIVE — SIGNIFICANT CHANGE UP
AMPHET UR-MCNC: NEGATIVE — SIGNIFICANT CHANGE UP
ANION GAP SERPL CALC-SCNC: 14 MMOL/L — SIGNIFICANT CHANGE UP (ref 5–17)
ANION GAP SERPL CALC-SCNC: 14 MMOL/L — SIGNIFICANT CHANGE UP (ref 5–17)
APAP SERPL-MCNC: <15 UG/ML — SIGNIFICANT CHANGE UP (ref 10–30)
APAP SERPL-MCNC: <15 UG/ML — SIGNIFICANT CHANGE UP (ref 10–30)
APPEARANCE UR: ABNORMAL
APPEARANCE UR: ABNORMAL
AST SERPL-CCNC: 18 U/L — SIGNIFICANT CHANGE UP (ref 10–40)
AST SERPL-CCNC: 18 U/L — SIGNIFICANT CHANGE UP (ref 10–40)
BACTERIA # UR AUTO: ABNORMAL /HPF
BACTERIA # UR AUTO: ABNORMAL /HPF
BARBITURATES UR SCN-MCNC: NEGATIVE — SIGNIFICANT CHANGE UP
BARBITURATES UR SCN-MCNC: NEGATIVE — SIGNIFICANT CHANGE UP
BASOPHILS # BLD AUTO: 0.02 K/UL — SIGNIFICANT CHANGE UP (ref 0–0.2)
BASOPHILS # BLD AUTO: 0.02 K/UL — SIGNIFICANT CHANGE UP (ref 0–0.2)
BASOPHILS NFR BLD AUTO: 0.2 % — SIGNIFICANT CHANGE UP (ref 0–2)
BASOPHILS NFR BLD AUTO: 0.2 % — SIGNIFICANT CHANGE UP (ref 0–2)
BENZODIAZ UR-MCNC: NEGATIVE — SIGNIFICANT CHANGE UP
BENZODIAZ UR-MCNC: NEGATIVE — SIGNIFICANT CHANGE UP
BILIRUB SERPL-MCNC: 0.5 MG/DL — SIGNIFICANT CHANGE UP (ref 0.2–1.2)
BILIRUB SERPL-MCNC: 0.5 MG/DL — SIGNIFICANT CHANGE UP (ref 0.2–1.2)
BILIRUB UR-MCNC: NEGATIVE — SIGNIFICANT CHANGE UP
BILIRUB UR-MCNC: NEGATIVE — SIGNIFICANT CHANGE UP
BUN SERPL-MCNC: 8 MG/DL — SIGNIFICANT CHANGE UP (ref 7–23)
BUN SERPL-MCNC: 8 MG/DL — SIGNIFICANT CHANGE UP (ref 7–23)
CALCIUM SERPL-MCNC: 9.4 MG/DL — SIGNIFICANT CHANGE UP (ref 8.4–10.5)
CALCIUM SERPL-MCNC: 9.4 MG/DL — SIGNIFICANT CHANGE UP (ref 8.4–10.5)
CAST: 0 /LPF — SIGNIFICANT CHANGE UP (ref 0–4)
CAST: 0 /LPF — SIGNIFICANT CHANGE UP (ref 0–4)
CHLORIDE SERPL-SCNC: 101 MMOL/L — SIGNIFICANT CHANGE UP (ref 96–108)
CHLORIDE SERPL-SCNC: 101 MMOL/L — SIGNIFICANT CHANGE UP (ref 96–108)
CO2 SERPL-SCNC: 21 MMOL/L — LOW (ref 22–31)
CO2 SERPL-SCNC: 21 MMOL/L — LOW (ref 22–31)
COCAINE METAB.OTHER UR-MCNC: NEGATIVE — SIGNIFICANT CHANGE UP
COCAINE METAB.OTHER UR-MCNC: NEGATIVE — SIGNIFICANT CHANGE UP
COLOR SPEC: YELLOW — SIGNIFICANT CHANGE UP
COLOR SPEC: YELLOW — SIGNIFICANT CHANGE UP
CREAT SERPL-MCNC: 0.6 MG/DL — SIGNIFICANT CHANGE UP (ref 0.5–1.3)
CREAT SERPL-MCNC: 0.6 MG/DL — SIGNIFICANT CHANGE UP (ref 0.5–1.3)
DIFF PNL FLD: ABNORMAL
DIFF PNL FLD: ABNORMAL
EGFR: 129 ML/MIN/1.73M2 — SIGNIFICANT CHANGE UP
EGFR: 129 ML/MIN/1.73M2 — SIGNIFICANT CHANGE UP
EOSINOPHIL # BLD AUTO: 0 K/UL — SIGNIFICANT CHANGE UP (ref 0–0.5)
EOSINOPHIL # BLD AUTO: 0 K/UL — SIGNIFICANT CHANGE UP (ref 0–0.5)
EOSINOPHIL NFR BLD AUTO: 0 % — SIGNIFICANT CHANGE UP (ref 0–6)
EOSINOPHIL NFR BLD AUTO: 0 % — SIGNIFICANT CHANGE UP (ref 0–6)
ETHANOL SERPL-MCNC: <10 MG/DL — SIGNIFICANT CHANGE UP (ref 0–10)
ETHANOL SERPL-MCNC: <10 MG/DL — SIGNIFICANT CHANGE UP (ref 0–10)
FLUAV AG NPH QL: SIGNIFICANT CHANGE UP
FLUAV AG NPH QL: SIGNIFICANT CHANGE UP
FLUBV AG NPH QL: SIGNIFICANT CHANGE UP
FLUBV AG NPH QL: SIGNIFICANT CHANGE UP
GLUCOSE SERPL-MCNC: 91 MG/DL — SIGNIFICANT CHANGE UP (ref 70–99)
GLUCOSE SERPL-MCNC: 91 MG/DL — SIGNIFICANT CHANGE UP (ref 70–99)
GLUCOSE UR QL: NEGATIVE MG/DL — SIGNIFICANT CHANGE UP
GLUCOSE UR QL: NEGATIVE MG/DL — SIGNIFICANT CHANGE UP
HCG SERPL-ACNC: <2 MIU/ML — SIGNIFICANT CHANGE UP
HCG SERPL-ACNC: <2 MIU/ML — SIGNIFICANT CHANGE UP
HCT VFR BLD CALC: 41.9 % — SIGNIFICANT CHANGE UP (ref 34.5–45)
HCT VFR BLD CALC: 41.9 % — SIGNIFICANT CHANGE UP (ref 34.5–45)
HGB BLD-MCNC: 14.1 G/DL — SIGNIFICANT CHANGE UP (ref 11.5–15.5)
HGB BLD-MCNC: 14.1 G/DL — SIGNIFICANT CHANGE UP (ref 11.5–15.5)
IMM GRANULOCYTES NFR BLD AUTO: 0.4 % — SIGNIFICANT CHANGE UP (ref 0–0.9)
IMM GRANULOCYTES NFR BLD AUTO: 0.4 % — SIGNIFICANT CHANGE UP (ref 0–0.9)
KETONES UR-MCNC: 40 MG/DL
KETONES UR-MCNC: 40 MG/DL
LEUKOCYTE ESTERASE UR-ACNC: ABNORMAL
LEUKOCYTE ESTERASE UR-ACNC: ABNORMAL
LYMPHOCYTES # BLD AUTO: 1.84 K/UL — SIGNIFICANT CHANGE UP (ref 1–3.3)
LYMPHOCYTES # BLD AUTO: 1.84 K/UL — SIGNIFICANT CHANGE UP (ref 1–3.3)
LYMPHOCYTES # BLD AUTO: 16.2 % — SIGNIFICANT CHANGE UP (ref 13–44)
LYMPHOCYTES # BLD AUTO: 16.2 % — SIGNIFICANT CHANGE UP (ref 13–44)
MCHC RBC-ENTMCNC: 28.2 PG — SIGNIFICANT CHANGE UP (ref 27–34)
MCHC RBC-ENTMCNC: 28.2 PG — SIGNIFICANT CHANGE UP (ref 27–34)
MCHC RBC-ENTMCNC: 33.7 GM/DL — SIGNIFICANT CHANGE UP (ref 32–36)
MCHC RBC-ENTMCNC: 33.7 GM/DL — SIGNIFICANT CHANGE UP (ref 32–36)
MCV RBC AUTO: 83.8 FL — SIGNIFICANT CHANGE UP (ref 80–100)
MCV RBC AUTO: 83.8 FL — SIGNIFICANT CHANGE UP (ref 80–100)
METHADONE UR-MCNC: NEGATIVE — SIGNIFICANT CHANGE UP
METHADONE UR-MCNC: NEGATIVE — SIGNIFICANT CHANGE UP
MONOCYTES # BLD AUTO: 0.58 K/UL — SIGNIFICANT CHANGE UP (ref 0–0.9)
MONOCYTES # BLD AUTO: 0.58 K/UL — SIGNIFICANT CHANGE UP (ref 0–0.9)
MONOCYTES NFR BLD AUTO: 5.1 % — SIGNIFICANT CHANGE UP (ref 2–14)
MONOCYTES NFR BLD AUTO: 5.1 % — SIGNIFICANT CHANGE UP (ref 2–14)
NEUTROPHILS # BLD AUTO: 8.9 K/UL — HIGH (ref 1.8–7.4)
NEUTROPHILS # BLD AUTO: 8.9 K/UL — HIGH (ref 1.8–7.4)
NEUTROPHILS NFR BLD AUTO: 78.1 % — HIGH (ref 43–77)
NEUTROPHILS NFR BLD AUTO: 78.1 % — HIGH (ref 43–77)
NITRITE UR-MCNC: NEGATIVE — SIGNIFICANT CHANGE UP
NITRITE UR-MCNC: NEGATIVE — SIGNIFICANT CHANGE UP
NRBC # BLD: 0 /100 WBCS — SIGNIFICANT CHANGE UP (ref 0–0)
NRBC # BLD: 0 /100 WBCS — SIGNIFICANT CHANGE UP (ref 0–0)
OPIATES UR-MCNC: NEGATIVE — SIGNIFICANT CHANGE UP
OPIATES UR-MCNC: NEGATIVE — SIGNIFICANT CHANGE UP
OXYCODONE UR-MCNC: NEGATIVE — SIGNIFICANT CHANGE UP
OXYCODONE UR-MCNC: NEGATIVE — SIGNIFICANT CHANGE UP
PCP SPEC-MCNC: SIGNIFICANT CHANGE UP
PCP SPEC-MCNC: SIGNIFICANT CHANGE UP
PCP UR-MCNC: NEGATIVE — SIGNIFICANT CHANGE UP
PCP UR-MCNC: NEGATIVE — SIGNIFICANT CHANGE UP
PH UR: 6 — SIGNIFICANT CHANGE UP (ref 5–8)
PH UR: 6 — SIGNIFICANT CHANGE UP (ref 5–8)
PLATELET # BLD AUTO: 473 K/UL — HIGH (ref 150–400)
PLATELET # BLD AUTO: 473 K/UL — HIGH (ref 150–400)
POTASSIUM SERPL-MCNC: 3.9 MMOL/L — SIGNIFICANT CHANGE UP (ref 3.5–5.3)
POTASSIUM SERPL-MCNC: 3.9 MMOL/L — SIGNIFICANT CHANGE UP (ref 3.5–5.3)
POTASSIUM SERPL-SCNC: 3.9 MMOL/L — SIGNIFICANT CHANGE UP (ref 3.5–5.3)
POTASSIUM SERPL-SCNC: 3.9 MMOL/L — SIGNIFICANT CHANGE UP (ref 3.5–5.3)
PROT SERPL-MCNC: 8.1 G/DL — SIGNIFICANT CHANGE UP (ref 6–8.3)
PROT SERPL-MCNC: 8.1 G/DL — SIGNIFICANT CHANGE UP (ref 6–8.3)
PROT UR-MCNC: NEGATIVE MG/DL — SIGNIFICANT CHANGE UP
PROT UR-MCNC: NEGATIVE MG/DL — SIGNIFICANT CHANGE UP
RBC # BLD: 5 M/UL — SIGNIFICANT CHANGE UP (ref 3.8–5.2)
RBC # BLD: 5 M/UL — SIGNIFICANT CHANGE UP (ref 3.8–5.2)
RBC # FLD: 12.4 % — SIGNIFICANT CHANGE UP (ref 10.3–14.5)
RBC # FLD: 12.4 % — SIGNIFICANT CHANGE UP (ref 10.3–14.5)
RBC CASTS # UR COMP ASSIST: 27 /HPF — HIGH (ref 0–4)
RBC CASTS # UR COMP ASSIST: 27 /HPF — HIGH (ref 0–4)
REVIEW: SIGNIFICANT CHANGE UP
REVIEW: SIGNIFICANT CHANGE UP
RSV RNA NPH QL NAA+NON-PROBE: SIGNIFICANT CHANGE UP
RSV RNA NPH QL NAA+NON-PROBE: SIGNIFICANT CHANGE UP
SALICYLATES SERPL-MCNC: <2 MG/DL — LOW (ref 15–30)
SALICYLATES SERPL-MCNC: <2 MG/DL — LOW (ref 15–30)
SARS-COV-2 RNA SPEC QL NAA+PROBE: DETECTED
SARS-COV-2 RNA SPEC QL NAA+PROBE: DETECTED
SODIUM SERPL-SCNC: 136 MMOL/L — SIGNIFICANT CHANGE UP (ref 135–145)
SODIUM SERPL-SCNC: 136 MMOL/L — SIGNIFICANT CHANGE UP (ref 135–145)
SP GR SPEC: 1.02 — SIGNIFICANT CHANGE UP (ref 1–1.03)
SP GR SPEC: 1.02 — SIGNIFICANT CHANGE UP (ref 1–1.03)
SQUAMOUS # UR AUTO: >36 /HPF — HIGH (ref 0–5)
SQUAMOUS # UR AUTO: >36 /HPF — HIGH (ref 0–5)
THC UR QL: POSITIVE
THC UR QL: POSITIVE
UROBILINOGEN FLD QL: 1 MG/DL — SIGNIFICANT CHANGE UP (ref 0.2–1)
UROBILINOGEN FLD QL: 1 MG/DL — SIGNIFICANT CHANGE UP (ref 0.2–1)
WBC # BLD: 11.38 K/UL — HIGH (ref 3.8–10.5)
WBC # BLD: 11.38 K/UL — HIGH (ref 3.8–10.5)
WBC # FLD AUTO: 11.38 K/UL — HIGH (ref 3.8–10.5)
WBC # FLD AUTO: 11.38 K/UL — HIGH (ref 3.8–10.5)
WBC UR QL: 270 /HPF — HIGH (ref 0–5)
WBC UR QL: 270 /HPF — HIGH (ref 0–5)

## 2024-01-10 PROCEDURE — 99285 EMERGENCY DEPT VISIT HI MDM: CPT

## 2024-01-10 PROCEDURE — 99223 1ST HOSP IP/OBS HIGH 75: CPT

## 2024-01-10 RX ORDER — CHLORHEXIDINE GLUCONATE 213 G/1000ML
1 SOLUTION TOPICAL
Refills: 0 | Status: DISCONTINUED | OUTPATIENT
Start: 2024-01-10 | End: 2024-01-17

## 2024-01-10 NOTE — ED BEHAVIORAL HEALTH ASSESSMENT NOTE - DETAILS
denies pass or present SI or HI discussed with ED will let Telepsych know COVID positive with GI symptoms and fatigue

## 2024-01-10 NOTE — H&P ADULT - HISTORY OF PRESENT ILLNESS
Patient declines to verbalize, history obtained from discussion with ER providers and reviewing psych as well as ER notes    23F w/ hx of psychiatric admission for anxiety BIB aunt for fatigue. Patient has tested Covid +, she feels fatigued and irritation of her stomach. She states her aunt, with whom she lives, became concerned that she is getting depressed because she has been sleeping a lot. Patient denies feeling depressed. She used to see a therapist and has taken anxiolytic medications in the past which she believes helped but is no longer seeing therapist or taking medications due to insurance issues. Pt states she is sleeping 7-8 hours a day, works as a  4pm-11pm, worked yesterday and plans to go back Patient declines to verbalize, history obtained from discussion with ER providers and reviewing psych as well as ER notes    23F w/ hx of psychiatric admission for anxiety BIB aunt for fatigue. Patient has tested Covid +, she feels fatigued and irritation of her stomach. She states her aunt, with whom she lives, became concerned that she is getting depressed because she has been sleeping a lot. Patient denies feeling depressed. She used to see a therapist and has taken anxiolytic medications in the past which she believes helped but is no longer seeing therapist or taking medications due to insurance issues. Pt states she is sleeping 7-8 hours a day, works as a  4pm-11pm, worked yesterday. Based on conversation between ER and Psych it was felt patient should likely go to inpatient psych but currently not candidate due to COVID. Denied SI and HI to multiple providers.    In ER: Seen by behavioral health

## 2024-01-10 NOTE — ED BEHAVIORAL HEALTH ASSESSMENT NOTE - NSSUICPROTFACT_PSY_ALL_CORE
is a  at a restaurant/Engaged in work or school is a  at a restaurant/Responsibility to children, family, or others/Identifies reasons for living/Supportive social network of family or friends/Engaged in work or school

## 2024-01-10 NOTE — ED ADULT NURSE NOTE - NSFALLUNIVINTERV_ED_ALL_ED
Bed/Stretcher in lowest position, wheels locked, appropriate side rails in place/Call bell, personal items and telephone in reach/Instruct patient to call for assistance before getting out of bed/chair/stretcher/Non-slip footwear applied when patient is off stretcher/[place] to call system/Physically safe environment - no spills, clutter or unnecessary equipment/Purposeful proactive rounding/Room/bathroom lighting operational, light cord in reach Bed/Stretcher in lowest position, wheels locked, appropriate side rails in place/Call bell, personal items and telephone in reach/Instruct patient to call for assistance before getting out of bed/chair/stretcher/Non-slip footwear applied when patient is off stretcher/[place] to call system/Physically safe environment - no spills, clutter or unnecessary equipment/Purposeful proactive rounding/Room/bathroom lighting operational, light cord in reach

## 2024-01-10 NOTE — H&P ADULT - ASSESSMENT
23F w/ hx of psychiatric admission for anxiety BIB aunt for borderline catatonia and incidentally covid

## 2024-01-10 NOTE — ED ADULT NURSE NOTE - NS ED BHA COCAINE
An Aquacell bandage was placed during surgery and may remain on until the 2 week office visit. This is water-proof and may shower with bandage in place. If the Aquacell bandage comes off, may wash incision with soap and water.  Pat incision dry. Do not apply any lotion or ointment to the incision.    You will be on aspirin 81mg twice daily for blood clot prevention.    You now have a joint replacement. Please be informed that if you need dental work you will need to take an antibiotic prior to any work done on your teeth.    Home Care: Home care nursing services have been arranged for you through [facility] (formerly [facility]). Your [facility] RN will contact you prior to your first home visit. For reference, the main [facility] number is [phone].        None known

## 2024-01-10 NOTE — ED BEHAVIORAL HEALTH ASSESSMENT NOTE - SUMMARY
23 year old with history of psychiatric admission for anxiety BIB aunt for fatigue. Patient has tested Covid +, she feels fatigued and irritation of her stomach. She states her aunt, with whom she lives, became concerned that she is getting depressed because she has been sleeping a lot. Patient denies feeling depressed. She used to see a therapist and has taken anxiolytic medications in the past which she believes helped but is no longer seeing therapist or taking medications due to insurance issues. Pt states she is sleeping 7-8 hours a day, works as a  4pm-11pm, worked yesterday and plans to go back tomorrow. She denies history of SI or HI.  When she is not working, she spends time with friends about once a month, other times she stays home watching tv. Denies recent anxiety attacks, last attack was a few months ago when she had an argument with her ex boyfriend. She denies trouble with the law. Denies alcohol or drug abuse.

## 2024-01-10 NOTE — ED PROVIDER NOTE - OBJECTIVE STATEMENT
22 yo female PMhx depression (last hospitalization 1 year ago) has been off her depression medicine for about 6 months per aunt at bedside brought into ED by aunt for reported depressive behavior and not sleeping.  Into bedside reports patient has been less responsive and more depressed over the last 2 weeks.  Patient denies this, states she does not feel depressed, only complaint right now is vague upper abdominal pain intermittent for last 1 week.  Denies SI/HI, auditory/visual hallucinations, fall/head trauma, headache, fever/chills, weakness, chest pain, shortness breath, abdominal pain, urinary symptoms, n/v/d.  Does endorse cannabis usage.

## 2024-01-10 NOTE — ED ADULT NURSE REASSESSMENT NOTE - NS ED NURSE REASSESS COMMENT FT1
pt. is to be admitted to medicine for further medical work-up. pt. is Covid +. 1:1 CO for s/i maintained.

## 2024-01-10 NOTE — ED BEHAVIORAL HEALTH ASSESSMENT NOTE - RISK ASSESSMENT
pt is not an acute risk to self or others at this time and has no thoughts of harming self or others and denies any history of SI/HI

## 2024-01-10 NOTE — ED PROVIDER NOTE - INTERPRETATION
normal sinus rhythm, Normal axis, Normal [place] interval and QRS complex. There are no acute ischemic ST or T-wave changes. normal sinus rhythm, Normal axis, Normal AR interval and QRS complex. There are no acute ischemic ST or T-wave changes.

## 2024-01-10 NOTE — ED PROVIDER NOTE - ATTENDING APP SHARED VISIT CONTRIBUTION OF CARE
pt is a 24 y/o female here for psych evaluation. pt is pt is a 22 y/o female here for psych evaluation hasn't been taking her meds for some time, depression less responsive, catatonic with her aunt.  Denies SI/HI, psych eval was admitted last year for psych reasons.

## 2024-01-10 NOTE — H&P ADULT - TIME BILLING
Need to interview and examine patient, discuss care with Dr. Hammer and Dr. Graves ER physicians, provide counseling, coordinate care, place orders, document, personally review imaging, ekg and review prior medical records

## 2024-01-10 NOTE — ED PROVIDER NOTE - PROGRESS NOTE DETAILS
psych consulted. Joseph ELIZABETH: received signout from previous team. Admitted for psych potential 2PC w/ COVID

## 2024-01-10 NOTE — ED PROVIDER NOTE - CARE PLAN
Principal Discharge DX:	Symptoms of depression   1 Principal Discharge DX:	COVID  Secondary Diagnosis:	Symptoms of depression

## 2024-01-10 NOTE — ED BEHAVIORAL HEALTH ASSESSMENT NOTE - HPI (INCLUDE ILLNESS QUALITY, SEVERITY, DURATION, TIMING, CONTEXT, MODIFYING FACTORS, ASSOCIATED SIGNS AND SYMPTOMS)
23 year old with history of psychiatric admission for anxiety BIB aunt for fatigue. Patient has tested covid +, she feels fatigued and irritation of her stomach. She states her aunt, with whom she lives, became concerned that she is getting depressed because she has been sleeping a lot. Patient denies feeling depressed. She used to see a therapist and has taken anxiolytic medications in the past which she believes helped but is no longer seeing therapist or taking medications due to insurance issues. Pt states she is sleeping 7-8 hours a day, works as a  4pm-11pm, worked yesterday and plans to go back tomorrow. She denies history of SI or HI.  When she is not working, she spends time with friends about once a month, other times she stays home watching tv. Denies recent anxiety attacks, last attack was a few months ago when she had an argument with her ex boyfriend. She denies trouble with the law. Denies alcohol or drug abuse.     She lives at home with maternal aunt. She has a sister living in [place] 23 year old with history of psychiatric admission for anxiety BIB aunt for fatigue. Patient has tested Covid +, she feels fatigued and irritation of her stomach. She states her aunt, with whom she lives, became concerned that she is getting depressed because she has been sleeping a lot. Patient denies feeling depressed. She used to see a therapist and has taken anxiolytic medications in the past which she believes helped but is no longer seeing therapist or taking medications due to insurance issues. Pt states she is sleeping 7-8 hours a day, works as a  4pm-11pm, worked yesterday and plans to go back tomorrow. She denies history of SI or HI.  When she is not working, she spends time with friends about once a month, other times she stays home watching tv. Denies recent anxiety attacks, last attack was a few months ago when she had an argument with her ex boyfriend. She denies trouble with the law. Denies alcohol or drug abuse.   She lives at home with maternal aunt. She has a sister living in [place].

## 2024-01-10 NOTE — ED ADULT NURSE NOTE - ISOLATION TYPE:
Dear Sonya Armendariz    Your recent labs were reviewed and released to your account. Your lab results were overall stable.  Diabetes test is in a borderline range. Triglycerides or fat in the blood are elevated.  Weight loss, healthy diet, exercise will help improve this.  Urine test does not show definitive infection and while we are waiting for the culture hi think it is reasonable to start on Myrbetriq medication for the bladder and stop the Detrol.  X-ray of the knee showed some arthritis changes and some plaque in the arteries.  Hopefully the diclofenac gel, leg exercises, and weight loss can help with these symptoms.    Please let me know if you have any questions.    Sivakumar Wing MD   None

## 2024-01-10 NOTE — H&P ADULT - NSHPLABSRESULTS_GEN_ALL_CORE
I have personally reviewed the labs, imaging and EKG. EKG with NSR HR 71 QTc 434 w/ sinus arrhythmia and non-specific ST segment findings.

## 2024-01-10 NOTE — H&P ADULT - NSHPPHYSICALEXAM_GEN_ALL_CORE
Vital Signs Last 24 Hrs  T(C): 36.7 (01-10-24 @ 22:50), Max: 36.8 (01-10-24 @ 10:27)  T(F): 98 (01-10-24 @ 22:50), Max: 98.3 (01-10-24 @ 10:27)  HR: 80 (01-10-24 @ 22:50) (80 - 100)  BP: 110/70 (01-10-24 @ 22:50) (110/70 - 123/84)  BP(mean): --  RR: 18 (01-10-24 @ 22:50) (18 - 18)  SpO2: 98% (01-10-24 @ 22:50) (98% - 100%)

## 2024-01-10 NOTE — ED ADULT NURSE NOTE - OBJECTIVE STATEMENT
23 year old female BIB mother with depression; pt is mostly non verbal, affect guarded, eye contact poor, but able to answer yes and no questions; mother states that pt smoked cannabis and is depressed, she had a prior admit with similar presentation on year ago, she is not in current [place], pt denies DERIAN; denies AVH; denies ETOH or other drug use; wanding done and mother took all belongings, CO begun; continue to monitor.

## 2024-01-10 NOTE — ED ADULT TRIAGE NOTE - SOURCE OF INFORMATION
Pre-application: Motor, sensory, and vascular responses intact in the injured extremity./Post-application: Motor, sensory, and vascular responses intact in the injured extremity./The patient/caregiver verbalized understanding of how to care for the injured extremity with splint Patient

## 2024-01-10 NOTE — ED ADULT TRIAGE NOTE - AS TEMP SITE
oral Carac Counseling:  I discussed with the patient the risks of Carac including but not limited to erythema, scaling, itching, weeping, crusting, and pain.

## 2024-01-10 NOTE — H&P ADULT - PROBLEM SELECTOR PLAN 1
Appreciate behavioral health recommendations. Discussed case with ER physicians. Thought patient would likely require psych admission if not COVID positive. Possible 2PC if not agreeable. Denied SI/HI. Note THC positive  -Cont. 1:1 for now  -F/u behavioral health recommendations  -Ativan 1mg PRN as per psych

## 2024-01-10 NOTE — H&P ADULT - RESPIRATORY
+ neck pain, + upper back pain, + right ankle pain, + left elbow, hand and hip pain/LIMITED RANGE OF MOTION
clear to auscultation bilaterally/no wheezes/no rales/no rhonchi

## 2024-01-10 NOTE — ED BEHAVIORAL HEALTH ASSESSMENT NOTE - NSBHATTESTCOMMENTATTENDFT_PSY_A_CORE
23 year old with history of psychiatric admission for anxiety BIB aunt for fatigue. Patient has tested Covid +, she feels fatigued and irritation of her stomach. She states her aunt, with whom she lives, became concerned that she is getting depressed because she has been sleeping a lot. Patient denies feeling depressed. She used to see a therapist and has taken anxiolytic medications in the past which she believes helped but is no longer seeing therapist or taking medications due to insurance issues. Pt states she is sleeping 7-8 hours a day, works as a  4pm-11pm, worked yesterday and plans to go back tomorrow. She denies history of SI or HI.  When she is not working, she spends time with friends about once a month, other times she stays home watching tv. Denies recent anxiety attacks, last attack was a few months ago when she had an argument with her ex boyfriend. She denies trouble with the law. Denies alcohol or drug abuse.  PT may be observed for symptoms of catatonia though currently denies.  She admits to having GI symptoms preventing her from eating.   Pt may f/u at the [facility] walk in clinic at [phone] 23 year old with history of psychiatric admission for anxiety BIB aunt for fatigue. Patient has tested Covid +, she feels fatigued and irritation of her stomach. She states her aunt, with whom she lives, became concerned that she is getting depressed because she has been sleeping a lot. Patient denies feeling depressed. She used to see a therapist and has taken anxiolytic medications in the past which she believes helped but is no longer seeing therapist or taking medications due to insurance issues. Pt states she is sleeping 7-8 hours a day, works as a  4pm-11pm, worked yesterday and plans to go back tomorrow. She denies history of SI or HI.  When she is not working, she spends time with friends about once a month, other times she stays home watching tv. Denies recent anxiety attacks, last attack was a few months ago when she had an argument with her ex boyfriend. She denies trouble with the law. Denies alcohol or drug abuse.  PT may be observed for symptoms of catatonia though currently denies.  She admits to having GI symptoms preventing her from eating.   Pt may f/u at the [facility] walk in clinic at [phone] 23 year old with history of psychiatric admission for anxiety BIB aunt for fatigue. Patient has tested Covid +, she feels fatigued and irritation of her stomach. She states her aunt, with whom she lives, became concerned that she is getting depressed because she has been sleeping a lot. Patient denies feeling depressed. She used to see a therapist and has taken anxiolytic medications in the past which she believes helped but is no longer seeing therapist or taking medications due to insurance issues. Pt states she is sleeping 7-8 hours a day, works as a  4pm-11pm, worked yesterday and plans to go back tomorrow. She denies history of SI or HI.  When she is not working, she spends time with friends about once a month, other times she stays home watching tv. Denies recent anxiety attacks, last attack was a few months ago when she had an argument with her ex boyfriend. She denies trouble with the law. Denies alcohol or drug abuse.  PT may be observed for symptoms of catatonia though currently denies.  She admits to having GI symptoms preventing her from eating.   Pt may f/u at the [facility] walk in clinic at [phone]  Pt may also follow up at the [facility] counseling center at [phone] 23 year old with history of psychiatric admission for anxiety BIB aunt for fatigue. Patient has tested Covid +, she feels fatigued and irritation of her stomach. She states her aunt, with whom she lives, became concerned that she is getting depressed because she has been sleeping a lot. Patient denies feeling depressed. She used to see a therapist and has taken anxiolytic medications in the past which she believes helped but is no longer seeing therapist or taking medications due to insurance issues. Pt states she is sleeping 7-8 hours a day, works as a  4pm-11pm, worked yesterday and plans to go back tomorrow. She denies history of SI or HI.  When she is not working, she spends time with friends about once a month, other times she stays home watching tv. Denies recent anxiety attacks, last attack was a few months ago when she had an argument with her ex boyfriend. She denies trouble with the law. Denies alcohol or drug abuse.  PT may be observed for symptoms of catatonia though currently denies.  She admits to having GI symptoms preventing her from eating.   Pt may f/u at the [facility] walk in clinic at [phone]  Pt may also follow up at the [facility] counseling center at [phone] 23 year old with history of psychiatric admission for anxiety BIB aunt for fatigue. Patient has tested Covid +, she feels fatigued and irritation of her stomach. She states her aunt, with whom she lives, became concerned that she is getting depressed because she has been sleeping a lot. Patient denies feeling depressed. She used to see a therapist and has taken anxiolytic medications in the past which she believes helped but is no longer seeing therapist or taking medications due to insurance issues. Pt states she is sleeping 7-8 hours a day, works as a  4pm-11pm, worked yesterday and plans to go back tomorrow. She denies history of SI or HI.  When she is not working, she spends time with friends about once a month, other times she stays home watching tv. Denies recent anxiety attacks, last attack was a few months ago when she had an argument with her ex boyfriend. She denies trouble with the law. Denies alcohol or drug abuse.  PT may be observed for symptoms of catatonia though currently denies.  She admits to having GI symptoms preventing her from eating.   Discussed pt with her aunt who is concerned that pt has developed worsening of her symptoms of psychosis and will need admission to psychiatry once she is Covid negative. discussed with ED staff Pt is a 23 year old with history of psychiatric admission for a prior history of psychosis now BIB aunt for fatigue. Patient has tested Covid +, she feels fatigued and irritation of her stomach. She states her aunt, with whom she lives, became concerned that she is getting depressed because she has been sleeping a lot. Patient denies feeling depressed. She used to see a therapist and has taken anxiolytic medications in the past which she believes helped but is no longer seeing therapist or taking medications due to insurance issues. Pt states she is sleeping 7-8 hours a day, works as a  4pm-11pm, worked yesterday and plans to go back tomorrow. She denies history of SI or HI.  When she is not working, she spends time with friends about once a month, other times she stays home watching tv. Denies recent anxiety attacks, last attack was a few months ago when she had an argument with her ex boyfriend. She denies trouble with the law. Denies alcohol or drug abuse.  PT may be observed for symptoms of catatonia though currently denies.  She admits to having GI symptoms preventing her from eating.   Discussed pt with her aunt who is concerned that pt has developed worsening of her symptoms of psychosis and will need admission to psychiatry once she is Covid negative. discussed with ED staff who may keep her in the ED though pt may not be eligible for psychiatric admission until she tests negative.

## 2024-01-11 DIAGNOSIS — R82.81 PYURIA: ICD-10-CM

## 2024-01-11 DIAGNOSIS — Z29.9 ENCOUNTER FOR PROPHYLACTIC MEASURES, UNSPECIFIED: ICD-10-CM

## 2024-01-11 DIAGNOSIS — R09.89 OTHER SPECIFIED SYMPTOMS AND SIGNS INVOLVING THE CIRCULATORY AND RESPIRATORY SYSTEMS: ICD-10-CM

## 2024-01-11 DIAGNOSIS — F32.9 MAJOR DEPRESSIVE DISORDER, SINGLE EPISODE, UNSPECIFIED: ICD-10-CM

## 2024-01-11 DIAGNOSIS — F29 UNSPECIFIED PSYCHOSIS NOT DUE TO A SUBSTANCE OR KNOWN PHYSIOLOGICAL CONDITION: ICD-10-CM

## 2024-01-11 DIAGNOSIS — U07.1 COVID-19: ICD-10-CM

## 2024-01-11 LAB
APPEARANCE UR: ABNORMAL
APPEARANCE UR: ABNORMAL
BACTERIA # UR AUTO: ABNORMAL /HPF
BACTERIA # UR AUTO: ABNORMAL /HPF
BILIRUB UR-MCNC: NEGATIVE — SIGNIFICANT CHANGE UP
BILIRUB UR-MCNC: NEGATIVE — SIGNIFICANT CHANGE UP
CAST: 1 /LPF — SIGNIFICANT CHANGE UP (ref 0–4)
CAST: 1 /LPF — SIGNIFICANT CHANGE UP (ref 0–4)
COLOR SPEC: YELLOW — SIGNIFICANT CHANGE UP
COLOR SPEC: YELLOW — SIGNIFICANT CHANGE UP
DIFF PNL FLD: ABNORMAL
DIFF PNL FLD: ABNORMAL
GLUCOSE UR QL: NEGATIVE MG/DL — SIGNIFICANT CHANGE UP
GLUCOSE UR QL: NEGATIVE MG/DL — SIGNIFICANT CHANGE UP
KETONES UR-MCNC: 40 MG/DL
KETONES UR-MCNC: 40 MG/DL
LEUKOCYTE ESTERASE UR-ACNC: ABNORMAL
LEUKOCYTE ESTERASE UR-ACNC: ABNORMAL
NITRITE UR-MCNC: NEGATIVE — SIGNIFICANT CHANGE UP
NITRITE UR-MCNC: NEGATIVE — SIGNIFICANT CHANGE UP
PH UR: 5.5 — SIGNIFICANT CHANGE UP (ref 5–8)
PH UR: 5.5 — SIGNIFICANT CHANGE UP (ref 5–8)
PROT UR-MCNC: NEGATIVE MG/DL — SIGNIFICANT CHANGE UP
PROT UR-MCNC: NEGATIVE MG/DL — SIGNIFICANT CHANGE UP
RBC CASTS # UR COMP ASSIST: 22 /HPF — HIGH (ref 0–4)
RBC CASTS # UR COMP ASSIST: 22 /HPF — HIGH (ref 0–4)
REVIEW: SIGNIFICANT CHANGE UP
REVIEW: SIGNIFICANT CHANGE UP
SP GR SPEC: 1.02 — SIGNIFICANT CHANGE UP (ref 1–1.03)
SP GR SPEC: 1.02 — SIGNIFICANT CHANGE UP (ref 1–1.03)
SQUAMOUS # UR AUTO: 17 /HPF — HIGH (ref 0–5)
SQUAMOUS # UR AUTO: 17 /HPF — HIGH (ref 0–5)
UROBILINOGEN FLD QL: 0.2 MG/DL — SIGNIFICANT CHANGE UP (ref 0.2–1)
UROBILINOGEN FLD QL: 0.2 MG/DL — SIGNIFICANT CHANGE UP (ref 0.2–1)
WBC UR QL: 109 /HPF — HIGH (ref 0–5)
WBC UR QL: 109 /HPF — HIGH (ref 0–5)

## 2024-01-11 PROCEDURE — 99232 SBSQ HOSP IP/OBS MODERATE 35: CPT

## 2024-01-11 PROCEDURE — 99233 SBSQ HOSP IP/OBS HIGH 50: CPT

## 2024-01-11 RX ORDER — RISPERIDONE 4 MG/1
0.5 TABLET ORAL
Refills: 0 | Status: DISCONTINUED | OUTPATIENT
Start: 2024-01-11 | End: 2024-01-12

## 2024-01-11 RX ORDER — ENOXAPARIN SODIUM 100 MG/ML
40 INJECTION SUBCUTANEOUS EVERY 24 HOURS
Refills: 0 | Status: DISCONTINUED | OUTPATIENT
Start: 2024-01-11 | End: 2024-01-17

## 2024-01-11 RX ORDER — LANOLIN ALCOHOL/MO/W.PET/CERES
3 CREAM (GRAM) TOPICAL AT BEDTIME
Refills: 0 | Status: DISCONTINUED | OUTPATIENT
Start: 2024-01-11 | End: 2024-01-17

## 2024-01-11 RX ADMIN — Medication 0.5 MILLIGRAM(S): at 19:03

## 2024-01-11 RX ADMIN — RISPERIDONE 0.5 MILLIGRAM(S): 4 TABLET ORAL at 19:03

## 2024-01-11 NOTE — PROGRESS NOTE ADULT - SUBJECTIVE AND OBJECTIVE BOX
Patient is a 23y old  Female who presents with a chief complaint of Catatonia (10 Manny 2024 23:35)      SUBJECTIVE / OVERNIGHT EVENTS:    feels ok denies dysuria. denies fevers, chills abd pain     ROS:  14 point ROS negative in detail except stated as above    MEDICATIONS  (STANDING):  chlorhexidine 2% Cloths 1 Application(s) Topical <User Schedule>  enoxaparin Injectable 40 milliGRAM(s) SubCutaneous every 24 hours    MEDICATIONS  (PRN):  LORazepam     Tablet 1 milliGRAM(s) Oral once PRN Agitation  melatonin 3 milliGRAM(s) Oral at bedtime PRN Insomnia      CAPILLARY BLOOD GLUCOSE        I&O's Summary    10 Manny 2024 07:01  -  2024 07:00  --------------------------------------------------------  IN: 240 mL / OUT: 0 mL / NET: 240 mL        PHYSICAL EXAM:  Vital Signs Last 24 Hrs  T(C): 37.1 (2024 11:46), Max: 37.1 (2024 11:46)  T(F): 98.8 (2024 11:46), Max: 98.8 (2024 11:46)  HR: 78 (2024 11:46) (78 - 100)  BP: 113/74 (2024 11:46) (106/76 - 123/81)  BP(mean): --  RR: 18 (2024 11:46) (18 - 18)  SpO2: 98% (2024 11:46) (98% - 100%)    Parameters below as of 2024 11:46  Patient On (Oxygen Delivery Method): room air    Vital Signs Last 24 Hrs  T(C): 37.1 (2024 11:46), Max: 37.1 (2024 11:46)  T(F): 98.8 (2024 11:46), Max: 98.8 (2024 11:46)  HR: 78 (2024 11:46) (78 - 100)  BP: 113/74 (2024 11:46) (106/76 - 123/81)  BP(mean): --  RR: 18 (2024 11:46) (18 - 18)  SpO2: 98% (2024 11:46) (98% - 100%)    Parameters below as of 2024 11:46  Patient On (Oxygen Delivery Method): room air        CONSTITUTIONAL: NAD, well-developed, well-groomed  EYES: PERRL; conjunctiva and sclera clear  ENMT: Moist oral mucosa, no pharyngeal injection or exudates; normal dentition  NECK: Supple, no palpable masses;   RESPIRATORY: Normal respiratory effort; lungs are clear to auscultation bilaterally  CARDIOVASCULAR: Regular rate and rhythm, normal S1 and S2, no murmur/rub/gallop; No lower extremity edema; Peripheral pulses are 2+ bilaterally  ABDOMEN: Nontender to palpation, normoactive bowel sounds, no rebound/guarding; No hepatosplenomegaly  MUSCULOSKELETAL:  Normal gait; no clubbing or cyanosis of digits; no joint swelling or tenderness to palpation  PSYCH: A+O to person, place, and time; flat affect  NEUROLOGY: CN 2-12 are intact and symmetric; no gross sensory deficits   SKIN: No rashes; no palpable lesions      LABS:                        14.1   11.38 )-----------( 473      ( 10 Manny 2024 11:36 )             41.9     01-10    136  |  101  |  8   ----------------------------<  91  3.9   |  21<L>  |  0.60    Ca    9.4      10 Manny 2024 11:36    TPro  8.1  /  Alb  4.6  /  TBili  0.5  /  DBili  x   /  AST  18  /  ALT  10  /  AlkPhos  63  01-10          Urinalysis Basic - ( 2024 11:45 )    Color: Yellow / Appearance: Cloudy / S.021 / pH: x  Gluc: x / Ketone: 40 mg/dL  / Bili: Negative / Urobili: 0.2 mg/dL   Blood: x / Protein: Negative mg/dL / Nitrite: Negative   Leuk Esterase: Large / RBC: 22 /HPF /  /HPF   Sq Epi: x / Non Sq Epi: 17 /HPF / Bacteria: Many /HPF        RADIOLOGY & ADDITIONAL TESTS:    Imaging Personally Reviewed:    Consultant(s) Notes Reviewed:      Care Discussed with Consultants/Other Providers:  zaira Uriostegui

## 2024-01-11 NOTE — ED BEHAVIORAL HEALTH NOTE - BEHAVIORAL HEALTH NOTE
================     Re-assessment Note     ================     SOURCE:  RN  and secondhand ED documentation.     BEHAVIOR:     RN described the patient to be calm, currently sleeping and under behavioral control. The patient has not been given medication overnight.

## 2024-01-12 PROCEDURE — 99233 SBSQ HOSP IP/OBS HIGH 50: CPT

## 2024-01-12 PROCEDURE — 99232 SBSQ HOSP IP/OBS MODERATE 35: CPT

## 2024-01-12 RX ORDER — RISPERIDONE 4 MG/1
1 TABLET ORAL
Refills: 0 | Status: DISCONTINUED | OUTPATIENT
Start: 2024-01-12 | End: 2024-01-17

## 2024-01-12 RX ADMIN — RISPERIDONE 0.5 MILLIGRAM(S): 4 TABLET ORAL at 05:21

## 2024-01-12 RX ADMIN — RISPERIDONE 1 MILLIGRAM(S): 4 TABLET ORAL at 19:11

## 2024-01-12 RX ADMIN — CHLORHEXIDINE GLUCONATE 1 APPLICATION(S): 213 SOLUTION TOPICAL at 05:21

## 2024-01-12 RX ADMIN — Medication 1 MILLIGRAM(S): at 22:11

## 2024-01-12 RX ADMIN — Medication 0.5 MILLIGRAM(S): at 05:21

## 2024-01-12 NOTE — CHART NOTE - NSCHARTNOTEFT_GEN_A_CORE
Spoke with  Dr. Salgado. Patient was 1:1 secondary to symptoms of catatonia and psychosis on admission.  Given current presentation, OK to transition to enhanced supervision.    Ninfa Red PA-C  Department of Medicine  [id number] Spoke with  Dr. Salgado. Patient was 1:1 secondary to symptoms of catatonia and psychosis on admission.  Given current presentation, OK to transition to enhanced supervision.    Ninfa Red PA-C  Department of Medicine  [id number]

## 2024-01-12 NOTE — PROGRESS NOTE ADULT - SUBJECTIVE AND OBJECTIVE BOX
Patient is a 23y old  Female who presents with a chief complaint of Catatonia (2024 13:03)      SUBJECTIVE / OVERNIGHT EVENTS:      feels better, denies dysuria sob, runny nose   ROS:  14 point ROS negative in detail except stated as above    MEDICATIONS  (STANDING):  chlorhexidine 2% Cloths 1 Application(s) Topical <User Schedule>  enoxaparin Injectable 40 milliGRAM(s) SubCutaneous every 24 hours  LORazepam     Tablet 0.5 milliGRAM(s) Oral two times a day  risperiDONE   Tablet 0.5 milliGRAM(s) Oral two times a day    MEDICATIONS  (PRN):  LORazepam     Tablet 1 milliGRAM(s) Oral once PRN Agitation  melatonin 3 milliGRAM(s) Oral at bedtime PRN Insomnia      CAPILLARY BLOOD GLUCOSE        I&O's Summary    2024 07:01  -  2024 07:00  --------------------------------------------------------  IN: 0 mL / OUT: 1 mL / NET: -1 mL        PHYSICAL EXAM:  Vital Signs Last 24 Hrs  T(C): 36.3 (2024 12:03), Max: 37.2 (2024 19:36)  T(F): 97.4 (2024 12:03), Max: 98.9 (2024 19:36)  HR: 103 (2024 12:03) (88 - 108)  BP: 115/77 (2024 12:03) (115/77 - 123/74)  BP(mean): --  RR: 18 (2024 12:03) (18 - 18)  SpO2: 97% (2024 12:03) (97% - 98%)    Parameters below as of 2024 12:03  Patient On (Oxygen Delivery Method): room air      CONSTITUTIONAL: NAD, well-developed, well-groomed  EYES: PERRL; conjunctiva and sclera clear  ENMT: Moist oral mucosa, no pharyngeal injection or exudates; normal dentition  NECK: Supple, no palpable masses;   RESPIRATORY: Normal respiratory effort; lungs are clear to auscultation bilaterally  CARDIOVASCULAR: Regular rate and rhythm, normal S1 and S2, no murmur/rub/gallop; No lower extremity edema; Peripheral pulses are 2+ bilaterally  ABDOMEN: Nontender to palpation, normoactive bowel sounds, no rebound/guarding; No hepatosplenomegaly  MUSCULOSKELETAL:  Normal gait; no clubbing or cyanosis of digits; no joint swelling or tenderness to palpation  PSYCH: A+O to person, place, and time; flat affect  NEUROLOGY: CN 2-12 are intact and symmetric; no gross sensory deficits   SKIN: No rashes; no palpable lesions      LABS:                Urinalysis Basic - ( 2024 11:45 )    Color: Yellow / Appearance: Cloudy / S.021 / pH: x  Gluc: x / Ketone: 40 mg/dL  / Bili: Negative / Urobili: 0.2 mg/dL   Blood: x / Protein: Negative mg/dL / Nitrite: Negative   Leuk Esterase: Large / RBC: 22 /HPF /  /HPF   Sq Epi: x / Non Sq Epi: 17 /HPF / Bacteria: Many /HPF        RADIOLOGY & ADDITIONAL TESTS:    Imaging Personally Reviewed:    Consultant(s) Notes Reviewed:      Care Discussed with Consultants/Other Providers:  zaira Ocasio

## 2024-01-13 PROCEDURE — 99232 SBSQ HOSP IP/OBS MODERATE 35: CPT

## 2024-01-13 PROCEDURE — 99233 SBSQ HOSP IP/OBS HIGH 50: CPT

## 2024-01-13 RX ADMIN — Medication 1 MILLIGRAM(S): at 22:51

## 2024-01-13 RX ADMIN — Medication 100 MILLIGRAM(S): at 04:12

## 2024-01-13 RX ADMIN — RISPERIDONE 1 MILLIGRAM(S): 4 TABLET ORAL at 18:12

## 2024-01-13 RX ADMIN — CHLORHEXIDINE GLUCONATE 1 APPLICATION(S): 213 SOLUTION TOPICAL at 07:50

## 2024-01-13 RX ADMIN — ENOXAPARIN SODIUM 40 MILLIGRAM(S): 100 INJECTION SUBCUTANEOUS at 06:33

## 2024-01-13 RX ADMIN — RISPERIDONE 1 MILLIGRAM(S): 4 TABLET ORAL at 06:33

## 2024-01-13 NOTE — PROGRESS NOTE ADULT - SUBJECTIVE AND OBJECTIVE BOX
Patient is a 23y old  Female who presents with a chief complaint of Catatonia (2024 13:46)      SUBJECTIVE / OVERNIGHT EVENTS:    feels better. no complaints. denies headache, runny nose, cough, dysuria    ROS:  14 point ROS negative in detail except stated as above    MEDICATIONS  (STANDING):  chlorhexidine 2% Cloths 1 Application(s) Topical <User Schedule>  enoxaparin Injectable 40 milliGRAM(s) SubCutaneous every 24 hours  LORazepam     Tablet 1 milliGRAM(s) Oral at bedtime  risperiDONE   Tablet 1 milliGRAM(s) Oral two times a day    MEDICATIONS  (PRN):  LORazepam     Tablet 1 milliGRAM(s) Oral once PRN Agitation  melatonin 3 milliGRAM(s) Oral at bedtime PRN Insomnia      CAPILLARY BLOOD GLUCOSE        I&O's Summary    2024 07:01  -  2024 07:00  --------------------------------------------------------  IN: 360 mL / OUT: 0 mL / NET: 360 mL        PHYSICAL EXAM:  Vital Signs Last 24 Hrs  T(C): 36.7 (2024 04:17), Max: 36.7 (2024 21:15)  T(F): 98.1 (2024 04:17), Max: 98.1 (2024 21:15)  HR: 74 (2024 04:17) (74 - 103)  BP: 130/79 (2024 04:17) (115/77 - 130/79)  BP(mean): --  RR: 18 (2024 04:17) (18 - 18)  SpO2: 96% (2024 04:17) (96% - 97%)    Parameters below as of 2024 04:17  Patient On (Oxygen Delivery Method): room air    CONSTITUTIONAL: NAD, well-developed, well-groomed  EYES: PERRL; conjunctiva and sclera clear  ENMT: Moist oral mucosa, no pharyngeal injection or exudates; normal dentition  NECK: Supple, no palpable masses;   RESPIRATORY: Normal respiratory effort; lungs are clear to auscultation bilaterally  CARDIOVASCULAR: Regular rate and rhythm, normal S1 and S2, no murmur/rub/gallop; No lower extremity edema; Peripheral pulses are 2+ bilaterally  ABDOMEN: Nontender to palpation, normoactive bowel sounds, no rebound/guarding; No hepatosplenomegaly  MUSCULOSKELETAL:  Normal gait; no clubbing or cyanosis of digits; no joint swelling or tenderness to palpation  PSYCH: A+O to person, place, and time; flat affect  NEUROLOGY: CN 2-12 are intact and symmetric; no gross sensory deficits   SKIN: No rashes; no palpable lesions        LABS:                Urinalysis Basic - ( 2024 11:45 )    Color: Yellow / Appearance: Cloudy / S.021 / pH: x  Gluc: x / Ketone: 40 mg/dL  / Bili: Negative / Urobili: 0.2 mg/dL   Blood: x / Protein: Negative mg/dL / Nitrite: Negative   Leuk Esterase: Large / RBC: 22 /HPF /  /HPF   Sq Epi: x / Non Sq Epi: 17 /HPF / Bacteria: Many /HPF        RADIOLOGY & ADDITIONAL TESTS:    Imaging Personally Reviewed:    Consultant(s) Notes Reviewed:      Care Discussed with Consultants/Other Providers:  zaira Vazquez

## 2024-01-14 PROCEDURE — 99231 SBSQ HOSP IP/OBS SF/LOW 25: CPT

## 2024-01-14 PROCEDURE — 99233 SBSQ HOSP IP/OBS HIGH 50: CPT

## 2024-01-14 RX ADMIN — Medication 100 MILLIGRAM(S): at 05:18

## 2024-01-14 RX ADMIN — RISPERIDONE 1 MILLIGRAM(S): 4 TABLET ORAL at 17:08

## 2024-01-14 RX ADMIN — Medication 1 MILLIGRAM(S): at 22:03

## 2024-01-14 RX ADMIN — CHLORHEXIDINE GLUCONATE 1 APPLICATION(S): 213 SOLUTION TOPICAL at 08:00

## 2024-01-14 RX ADMIN — ENOXAPARIN SODIUM 40 MILLIGRAM(S): 100 INJECTION SUBCUTANEOUS at 05:18

## 2024-01-14 RX ADMIN — RISPERIDONE 1 MILLIGRAM(S): 4 TABLET ORAL at 05:18

## 2024-01-15 LAB
ANION GAP SERPL CALC-SCNC: 9 MMOL/L — SIGNIFICANT CHANGE UP (ref 5–17)
ANION GAP SERPL CALC-SCNC: 9 MMOL/L — SIGNIFICANT CHANGE UP (ref 5–17)
BUN SERPL-MCNC: 10 MG/DL — SIGNIFICANT CHANGE UP (ref 7–23)
BUN SERPL-MCNC: 10 MG/DL — SIGNIFICANT CHANGE UP (ref 7–23)
CALCIUM SERPL-MCNC: 9.1 MG/DL — SIGNIFICANT CHANGE UP (ref 8.4–10.5)
CALCIUM SERPL-MCNC: 9.1 MG/DL — SIGNIFICANT CHANGE UP (ref 8.4–10.5)
CHLORIDE SERPL-SCNC: 104 MMOL/L — SIGNIFICANT CHANGE UP (ref 96–108)
CHLORIDE SERPL-SCNC: 104 MMOL/L — SIGNIFICANT CHANGE UP (ref 96–108)
CO2 SERPL-SCNC: 24 MMOL/L — SIGNIFICANT CHANGE UP (ref 22–31)
CO2 SERPL-SCNC: 24 MMOL/L — SIGNIFICANT CHANGE UP (ref 22–31)
CREAT SERPL-MCNC: 0.59 MG/DL — SIGNIFICANT CHANGE UP (ref 0.5–1.3)
CREAT SERPL-MCNC: 0.59 MG/DL — SIGNIFICANT CHANGE UP (ref 0.5–1.3)
EGFR: 130 ML/MIN/1.73M2 — SIGNIFICANT CHANGE UP
EGFR: 130 ML/MIN/1.73M2 — SIGNIFICANT CHANGE UP
GLUCOSE SERPL-MCNC: 98 MG/DL — SIGNIFICANT CHANGE UP (ref 70–99)
GLUCOSE SERPL-MCNC: 98 MG/DL — SIGNIFICANT CHANGE UP (ref 70–99)
HCT VFR BLD CALC: 38.5 % — SIGNIFICANT CHANGE UP (ref 34.5–45)
HCT VFR BLD CALC: 38.5 % — SIGNIFICANT CHANGE UP (ref 34.5–45)
HGB BLD-MCNC: 12.8 G/DL — SIGNIFICANT CHANGE UP (ref 11.5–15.5)
HGB BLD-MCNC: 12.8 G/DL — SIGNIFICANT CHANGE UP (ref 11.5–15.5)
MCHC RBC-ENTMCNC: 28.2 PG — SIGNIFICANT CHANGE UP (ref 27–34)
MCHC RBC-ENTMCNC: 28.2 PG — SIGNIFICANT CHANGE UP (ref 27–34)
MCHC RBC-ENTMCNC: 33.2 GM/DL — SIGNIFICANT CHANGE UP (ref 32–36)
MCHC RBC-ENTMCNC: 33.2 GM/DL — SIGNIFICANT CHANGE UP (ref 32–36)
MCV RBC AUTO: 84.8 FL — SIGNIFICANT CHANGE UP (ref 80–100)
MCV RBC AUTO: 84.8 FL — SIGNIFICANT CHANGE UP (ref 80–100)
NRBC # BLD: 0 /100 WBCS — SIGNIFICANT CHANGE UP (ref 0–0)
NRBC # BLD: 0 /100 WBCS — SIGNIFICANT CHANGE UP (ref 0–0)
PLATELET # BLD AUTO: 402 K/UL — HIGH (ref 150–400)
PLATELET # BLD AUTO: 402 K/UL — HIGH (ref 150–400)
POTASSIUM SERPL-MCNC: 4.1 MMOL/L — SIGNIFICANT CHANGE UP (ref 3.5–5.3)
POTASSIUM SERPL-MCNC: 4.1 MMOL/L — SIGNIFICANT CHANGE UP (ref 3.5–5.3)
POTASSIUM SERPL-SCNC: 4.1 MMOL/L — SIGNIFICANT CHANGE UP (ref 3.5–5.3)
POTASSIUM SERPL-SCNC: 4.1 MMOL/L — SIGNIFICANT CHANGE UP (ref 3.5–5.3)
RBC # BLD: 4.54 M/UL — SIGNIFICANT CHANGE UP (ref 3.8–5.2)
RBC # BLD: 4.54 M/UL — SIGNIFICANT CHANGE UP (ref 3.8–5.2)
RBC # FLD: 12.4 % — SIGNIFICANT CHANGE UP (ref 10.3–14.5)
RBC # FLD: 12.4 % — SIGNIFICANT CHANGE UP (ref 10.3–14.5)
SODIUM SERPL-SCNC: 137 MMOL/L — SIGNIFICANT CHANGE UP (ref 135–145)
SODIUM SERPL-SCNC: 137 MMOL/L — SIGNIFICANT CHANGE UP (ref 135–145)
WBC # BLD: 11.56 K/UL — HIGH (ref 3.8–10.5)
WBC # BLD: 11.56 K/UL — HIGH (ref 3.8–10.5)
WBC # FLD AUTO: 11.56 K/UL — HIGH (ref 3.8–10.5)
WBC # FLD AUTO: 11.56 K/UL — HIGH (ref 3.8–10.5)

## 2024-01-15 PROCEDURE — 99231 SBSQ HOSP IP/OBS SF/LOW 25: CPT

## 2024-01-15 PROCEDURE — 99233 SBSQ HOSP IP/OBS HIGH 50: CPT

## 2024-01-15 RX ADMIN — Medication 1 MILLIGRAM(S): at 21:20

## 2024-01-15 RX ADMIN — RISPERIDONE 1 MILLIGRAM(S): 4 TABLET ORAL at 06:08

## 2024-01-15 RX ADMIN — RISPERIDONE 1 MILLIGRAM(S): 4 TABLET ORAL at 17:12

## 2024-01-15 RX ADMIN — ENOXAPARIN SODIUM 40 MILLIGRAM(S): 100 INJECTION SUBCUTANEOUS at 06:07

## 2024-01-15 RX ADMIN — Medication 100 MILLIGRAM(S): at 21:19

## 2024-01-15 RX ADMIN — CHLORHEXIDINE GLUCONATE 1 APPLICATION(S): 213 SOLUTION TOPICAL at 06:07

## 2024-01-15 RX ADMIN — Medication 100 MILLIGRAM(S): at 10:00

## 2024-01-15 NOTE — PROGRESS NOTE ADULT - SUBJECTIVE AND OBJECTIVE BOX
Patient is a 23y old  Female who presents with a chief complaint of Catatonia (14 Jan 2024 10:42)      SUBJECTIVE / OVERNIGHT EVENTS:    feels well. cough is improving.     ROS:  14 point ROS negative in detail except stated as above    MEDICATIONS  (STANDING):  benzonatate 100 milliGRAM(s) Oral every 8 hours  chlorhexidine 2% Cloths 1 Application(s) Topical <User Schedule>  enoxaparin Injectable 40 milliGRAM(s) SubCutaneous every 24 hours  LORazepam     Tablet 1 milliGRAM(s) Oral at bedtime  risperiDONE   Tablet 1 milliGRAM(s) Oral two times a day    MEDICATIONS  (PRN):  melatonin 3 milliGRAM(s) Oral at bedtime PRN Insomnia      CAPILLARY BLOOD GLUCOSE        I&O's Summary    14 Jan 2024 07:01  -  15 Manny 2024 07:00  --------------------------------------------------------  IN: 360 mL / OUT: 0 mL / NET: 360 mL    15 Manny 2024 07:01  -  15 Manny 2024 12:52  --------------------------------------------------------  IN: 200 mL / OUT: 0 mL / NET: 200 mL        PHYSICAL EXAM:  Vital Signs Last 24 Hrs  T(C): 36.9 (15 Manny 2024 12:33), Max: 37 (14 Jan 2024 21:43)  T(F): 98.5 (15 Manny 2024 12:33), Max: 98.6 (14 Jan 2024 21:43)  HR: 91 (15 Manny 2024 12:33) (88 - 105)  BP: 130/81 (15 Manny 2024 12:33) (115/65 - 137/81)  BP(mean): --  RR: 18 (15 Manny 2024 12:33) (18 - 18)  SpO2: 98% (15 Manny 2024 12:33) (96% - 99%)    Parameters below as of 15 Manny 2024 12:33  Patient On (Oxygen Delivery Method): room air    Vital Signs Last 24 Hrs  T(C): 36.9 (15 Manny 2024 12:33), Max: 37 (14 Jan 2024 21:43)  T(F): 98.5 (15 Manny 2024 12:33), Max: 98.6 (14 Jan 2024 21:43)  HR: 91 (15 Manny 2024 12:33) (88 - 105)  BP: 130/81 (15 Manny 2024 12:33) (115/65 - 137/81)  BP(mean): --  RR: 18 (15 Manny 2024 12:33) (18 - 18)  SpO2: 98% (15 Manny 2024 12:33) (96% - 99%)    Parameters below as of 15 Manny 2024 12:33  Patient On (Oxygen Delivery Method): room air        CONSTITUTIONAL: NAD, well-developed, well-groomed  EYES: PERRL; conjunctiva and sclera clear  ENMT: Moist oral mucosa, no pharyngeal injection or exudates; normal dentition  NECK: Supple, no palpable masses; no thyromegaly  RESPIRATORY: Normal respiratory effort; lungs are clear to auscultation bilaterally  CARDIOVASCULAR: Regular rate and rhythm, normal S1 and S2, no murmur/rub/gallop; No lower extremity edema; Peripheral pulses are 2+ bilaterally  ABDOMEN: Nontender to palpation, normoactive bowel sounds, no rebound/guarding; No hepatosplenomegaly  MUSCULOSKELETAL:  Normal gait; no clubbing or cyanosis of digits; no joint swelling or tenderness to palpation  PSYCH: A+O to person, place, and time; affect appropriate  NEUROLOGY: CN 2-12 are intact and symmetric; no gross sensory deficits   SKIN: No rashes; no palpable lesions  LABS:                        12.8   11.56 )-----------( 402      ( 15 Manny 2024 12:06 )             38.5     01-15    137  |  104  |  10  ----------------------------<  98  4.1   |  24  |  0.59    Ca    9.1      15 Manny 2024 12:06            Urinalysis Basic - ( 15 Manny 2024 12:06 )    Color: x / Appearance: x / SG: x / pH: x  Gluc: 98 mg/dL / Ketone: x  / Bili: x / Urobili: x   Blood: x / Protein: x / Nitrite: x   Leuk Esterase: x / RBC: x / WBC x   Sq Epi: x / Non Sq Epi: x / Bacteria: x        RADIOLOGY & ADDITIONAL TESTS:    Imaging Personally Reviewed:    Consultant(s) Notes Reviewed:      Care Discussed with Consultants/Other Providers:   Patient is a 23y old  Female who presents with a chief complaint of Catatonia (14 Jan 2024 10:42)      SUBJECTIVE / OVERNIGHT EVENTS:    feels well. cough is improving.     ROS:  14 point ROS negative in detail except stated as above    MEDICATIONS  (STANDING):  benzonatate 100 milliGRAM(s) Oral every 8 hours  chlorhexidine 2% Cloths 1 Application(s) Topical <User Schedule>  enoxaparin Injectable 40 milliGRAM(s) SubCutaneous every 24 hours  LORazepam     Tablet 1 milliGRAM(s) Oral at bedtime  risperiDONE   Tablet 1 milliGRAM(s) Oral two times a day    MEDICATIONS  (PRN):  melatonin 3 milliGRAM(s) Oral at bedtime PRN Insomnia      CAPILLARY BLOOD GLUCOSE        I&O's Summary    14 Jan 2024 07:01  -  15 Manny 2024 07:00  --------------------------------------------------------  IN: 360 mL / OUT: 0 mL / NET: 360 mL    15 Manny 2024 07:01  -  15 Mnany 2024 12:52  --------------------------------------------------------  IN: 200 mL / OUT: 0 mL / NET: 200 mL        PHYSICAL EXAM:  Vital Signs Last 24 Hrs  T(C): 36.9 (15 Manny 2024 12:33), Max: 37 (14 Jan 2024 21:43)  T(F): 98.5 (15 Manny 2024 12:33), Max: 98.6 (14 Jan 2024 21:43)  HR: 91 (15 Manny 2024 12:33) (88 - 105)  BP: 130/81 (15 Manny 2024 12:33) (115/65 - 137/81)  BP(mean): --  RR: 18 (15 Manny 2024 12:33) (18 - 18)  SpO2: 98% (15 Manny 2024 12:33) (96% - 99%)    Parameters below as of 15 Manny 2024 12:33  Patient On (Oxygen Delivery Method): room air    Vital Signs Last 24 Hrs  T(C): 36.9 (15 Manny 2024 12:33), Max: 37 (14 Jan 2024 21:43)  T(F): 98.5 (15 Manny 2024 12:33), Max: 98.6 (14 Jan 2024 21:43)  HR: 91 (15 Manny 2024 12:33) (88 - 105)  BP: 130/81 (15 Manny 2024 12:33) (115/65 - 137/81)  BP(mean): --  RR: 18 (15 Manny 2024 12:33) (18 - 18)  SpO2: 98% (15 Manny 2024 12:33) (96% - 99%)    Parameters below as of 15 Manny 2024 12:33  Patient On (Oxygen Delivery Method): room air        CONSTITUTIONAL: NAD, well-developed, well-groomed  EYES: PERRL; conjunctiva and sclera clear  ENMT: Moist oral mucosa, no pharyngeal injection or exudates; normal dentition  NECK: Supple, no palpable masses; no thyromegaly  RESPIRATORY: Normal respiratory effort; lungs are clear to auscultation bilaterally  CARDIOVASCULAR: Regular rate and rhythm, normal S1 and S2, no murmur/rub/gallop; No lower extremity edema; Peripheral pulses are 2+ bilaterally  ABDOMEN: Nontender to palpation, normoactive bowel sounds, no rebound/guarding; No hepatosplenomegaly  MUSCULOSKELETAL:  Normal gait; no clubbing or cyanosis of digits; no joint swelling or tenderness to palpation  PSYCH: A+O to person, place, and time; affect appropriate  NEUROLOGY: CN 2-12 are intact and symmetric; no gross sensory deficits   SKIN: No rashes; no palpable lesions  LABS:                        12.8   11.56 )-----------( 402      ( 15 Manny 2024 12:06 )             38.5     01-15    137  |  104  |  10  ----------------------------<  98  4.1   |  24  |  0.59    Ca    9.1      15 Manny 2024 12:06            Urinalysis Basic - ( 15 Manny 2024 12:06 )    Color: x / Appearance: x / SG: x / pH: x  Gluc: 98 mg/dL / Ketone: x  / Bili: x / Urobili: x   Blood: x / Protein: x / Nitrite: x   Leuk Esterase: x / RBC: x / WBC x   Sq Epi: x / Non Sq Epi: x / Bacteria: x        RADIOLOGY & ADDITIONAL TESTS:    Imaging Personally Reviewed:    Consultant(s) Notes Reviewed:      Care Discussed with Consultants/Other Providers:

## 2024-01-15 NOTE — BH CONSULTATION LIAISON PROGRESS NOTE - NSBHATTESTBILLING_PSY_A_CORE
74061-[id number] OBS or IP - low complexity OR 25-34 mins [id number]-Gixhqurukx OBS or IP - low complexity OR 25-34 mins [id number]-Bsjsveitrv OBS or IP - low complexity OR 25-34 mins

## 2024-01-16 PROCEDURE — 99232 SBSQ HOSP IP/OBS MODERATE 35: CPT

## 2024-01-16 RX ADMIN — Medication 100 MILLIGRAM(S): at 13:59

## 2024-01-16 RX ADMIN — CHLORHEXIDINE GLUCONATE 1 APPLICATION(S): 213 SOLUTION TOPICAL at 05:22

## 2024-01-16 RX ADMIN — Medication 100 MILLIGRAM(S): at 21:16

## 2024-01-16 RX ADMIN — Medication 100 MILLIGRAM(S): at 05:22

## 2024-01-16 RX ADMIN — ENOXAPARIN SODIUM 40 MILLIGRAM(S): 100 INJECTION SUBCUTANEOUS at 05:23

## 2024-01-16 RX ADMIN — RISPERIDONE 1 MILLIGRAM(S): 4 TABLET ORAL at 05:22

## 2024-01-16 RX ADMIN — RISPERIDONE 1 MILLIGRAM(S): 4 TABLET ORAL at 18:04

## 2024-01-16 RX ADMIN — Medication 1 MILLIGRAM(S): at 21:16

## 2024-01-16 NOTE — PROGRESS NOTE ADULT - PROBLEM SELECTOR PLAN 2
COVID positive. No 02 needs currently.  -Supportive care  -Isolation precautions
COVID positive. No 02 needs currently. normal respiratory exam   does not meet criteria for remdesvir /dexamethasone   -Supportive care  -Isolation precautions
COVID positive. No 02 needs currently.  -Supportive care  -Isolation precautions
COVID positive. No 02 needs currently.  -Supportive care  -Isolation precautions

## 2024-01-16 NOTE — PROGRESS NOTE ADULT - PROBLEM SELECTOR PLAN 4
DVT Ppx  -Lovenox

## 2024-01-16 NOTE — PROGRESS NOTE ADULT - PROBLEM SELECTOR PLAN 1
Appreciate behavioral health recommendations. Thought patient would likely require psych admission if not COVID positive. Possible 2PC if not agreeable. Denied SI/HI. Note THC positive; catatonia improved- patient laughing and joking   risperidone 1mg BID- check EKG for QTC, ativan 1mg qhs also started  -Cont. 1:1 for now  -F/u behavioral health recommendations
Appreciate behavioral health recommendations. Thought patient would likely require psych admission if not COVID positive. Possible 2PC if not agreeable. Denied SI/HI. Note THC positive; catatonia improved- patient laughing and joking   risperidone 1mg BID- , ativan 1mg qhs also started  -Cont. 1:1 for now  -F/u behavioral health recommendations
Appreciate behavioral health recommendations. Thought patient would likely require psych admission if not COVID positive. Possible 2PC if not agreeable. Denied SI/HI. Note THC positive; catatonia improving  risperidone .5mg BID- d/w Dr. Salgado of psych- increase to 1mg BID pending EKG to check qtc  -Cont. 1:1 for now  -F/u behavioral health recommendations  -Ativan 1mg PRN as per psych
Appreciate behavioral health recommendations. Patient likely will require psych admission   Note THC positive   Catatonia improving; This AM, patient alert, oriented x3, conversational  risperidone 1mg BID- , ativan 1mg qhs also started  -Cont. 1:1 for now  -F/u behavioral health recommendations, per last note: Needs further psych safety assessment prior to discharge
Appreciate behavioral health recommendations. Thought patient would likely require psych admission if not COVID positive. Possible 2PC if not agreeable. Denied SI/HI. Note THC positive; catatonia improving  risperidone 1mg BID- check EKG for QTC, ativan 1mg qhs also started  -Cont. 1:1 for now  -F/u behavioral health recommendations
Appreciate behavioral health recommendations. Thought patient would likely require psych admission if not COVID positive. Possible 2PC if not agreeable. Denied SI/HI. Note THC positive  -Cont. 1:1 for now  -F/u behavioral health recommendations  -Ativan 1mg PRN as per psych

## 2024-01-16 NOTE — PROGRESS NOTE ADULT - PROBLEM SELECTOR PLAN 3
- U/A contaminated- no uti symptoms  - will not tx asymptomatic bacteruria

## 2024-01-16 NOTE — PROGRESS NOTE ADULT - SUBJECTIVE AND OBJECTIVE BOX
PROGRESS NOTE:     Patient is a 23y old  Female who presents with a chief complaint of Catatonia (15 Manny 2024 12:50)      SUBJECTIVE / OVERNIGHT EVENTS: Patient seen and evaluated at bedside. Patient denies chest pain, SOB, or abdominal pain.   ADDITIONAL REVIEW OF SYSTEMS:    MEDICATIONS  (STANDING):  benzonatate 100 milliGRAM(s) Oral every 8 hours  chlorhexidine 2% Cloths 1 Application(s) Topical <User Schedule>  enoxaparin Injectable 40 milliGRAM(s) SubCutaneous every 24 hours  LORazepam     Tablet 1 milliGRAM(s) Oral at bedtime  risperiDONE   Tablet 1 milliGRAM(s) Oral two times a day    MEDICATIONS  (PRN):  melatonin 3 milliGRAM(s) Oral at bedtime PRN Insomnia      CAPILLARY BLOOD GLUCOSE        I&O's Summary    15 Manny 2024 07:01  -  16 Jan 2024 07:00  --------------------------------------------------------  IN: 500 mL / OUT: 0 mL / NET: 500 mL        PHYSICAL EXAM:  Vital Signs Last 24 Hrs  T(C): 36.6 (16 Jan 2024 11:23), Max: 36.9 (15 Manny 2024 12:33)  T(F): 97.8 (16 Jan 2024 11:23), Max: 98.5 (15 Manny 2024 12:33)  HR: 88 (16 Jan 2024 11:23) (87 - 99)  BP: 122/78 (16 Jan 2024 11:23) (118/81 - 130/81)  BP(mean): --  RR: 18 (16 Jan 2024 11:23) (18 - 18)  SpO2: 97% (16 Jan 2024 11:23) (97% - 98%)    Parameters below as of 16 Jan 2024 06:02  Patient On (Oxygen Delivery Method): room air        CONSTITUTIONAL: NAD  RESPIRATORY: Normal respiratory effort; lungs are clear to auscultation bilaterally  CARDIOVASCULAR: Regular rate and rhythm, normal S1 and S2, no murmur; No lower extremity edema.  ABDOMEN: Nontender to palpation, normoactive bowel sounds, no rebound/guarding.  PSYCH: A+O to person, place, and time; pleasant affect   LABS:                        12.8   11.56 )-----------( 402      ( 15 Manny 2024 12:06 )             38.5     01-15    137  |  104  |  10  ----------------------------<  98  4.1   |  24  |  0.59    Ca    9.1      15 Manny 2024 12:06            Urinalysis Basic - ( 15 Manny 2024 12:06 )    Color: x / Appearance: x / SG: x / pH: x  Gluc: 98 mg/dL / Ketone: x  / Bili: x / Urobili: x   Blood: x / Protein: x / Nitrite: x   Leuk Esterase: x / RBC: x / WBC x   Sq Epi: x / Non Sq Epi: x / Bacteria: x          RADIOLOGY & ADDITIONAL TESTS:  Results Reviewed:   Imaging Personally Reviewed:  Electrocardiogram Personally Reviewed:    COORDINATION OF CARE:  Care Discussed with Consultants/Other Providers [Y/N]:  Prior or Outpatient Records Reviewed [Y/N]:

## 2024-01-16 NOTE — BH CONSULTATION LIAISON PROGRESS NOTE - NSBHCONSULTFOLLOWAFTERCARE_PSY_A_CORE FT
Pt may go home with her aunt and have follow up at the [facility] so that she can see a psychiatrist there who can prescribe her Risperidone. 1mg BID Pt may go home with her aunt and have follow up at the [facility] so that she can see a psychiatrist there who can prescribe her Risperidone. 1mg BID

## 2024-01-16 NOTE — PROGRESS NOTE ADULT - NSPROGADDITIONALINFOA_GEN_ALL_CORE
d/w aunt Iris
updated aunt Mere Garduno
updated aunt Anum Garduno dc pending final psych recs 2PC v home
D/w ACP Tez Mcfadden
pending final psych recs  SW following

## 2024-01-16 NOTE — PROGRESS NOTE ADULT - PROBLEM SELECTOR PROBLEM 1
Major depressive disorder, single episode with catatonic features

## 2024-01-16 NOTE — BH CONSULTATION LIAISON PROGRESS NOTE - NSBHATTESTBILLING_PSY_A_CORE
[id number]-Ytzwvkhjqv OBS or IP - moderate complexity OR 35-49 mins [id number]-Njixstrxuh OBS or IP - moderate complexity OR 35-49 mins

## 2024-01-17 VITALS
SYSTOLIC BLOOD PRESSURE: 125 MMHG | TEMPERATURE: 98 F | HEART RATE: 108 BPM | OXYGEN SATURATION: 98 % | DIASTOLIC BLOOD PRESSURE: 82 MMHG | RESPIRATION RATE: 18 BRPM

## 2024-01-17 PROCEDURE — 80048 BASIC METABOLIC PNL TOTAL CA: CPT

## 2024-01-17 PROCEDURE — 81001 URINALYSIS AUTO W/SCOPE: CPT

## 2024-01-17 PROCEDURE — 80053 COMPREHEN METABOLIC PANEL: CPT

## 2024-01-17 PROCEDURE — 99239 HOSP IP/OBS DSCHRG MGMT >30: CPT

## 2024-01-17 PROCEDURE — 83690 ASSAY OF LIPASE: CPT

## 2024-01-17 PROCEDURE — 80307 DRUG TEST PRSMV CHEM ANLYZR: CPT

## 2024-01-17 PROCEDURE — 99232 SBSQ HOSP IP/OBS MODERATE 35: CPT

## 2024-01-17 PROCEDURE — 99285 EMERGENCY DEPT VISIT HI MDM: CPT

## 2024-01-17 PROCEDURE — 85025 COMPLETE CBC W/AUTO DIFF WBC: CPT

## 2024-01-17 PROCEDURE — 84702 CHORIONIC GONADOTROPIN TEST: CPT

## 2024-01-17 PROCEDURE — 87637 SARSCOV2&INF A&B&RSV AMP PRB: CPT

## 2024-01-17 PROCEDURE — 85027 COMPLETE CBC AUTOMATED: CPT

## 2024-01-17 RX ORDER — RISPERIDONE 4 MG/1
1 TABLET ORAL
Qty: 60 | Refills: 0
Start: 2024-01-17 | End: 2024-02-15

## 2024-01-17 RX ORDER — RISPERIDONE 4 MG/1
1 TABLET ORAL
Qty: 60 | Refills: 1
Start: 2024-01-17 | End: 2024-03-16

## 2024-01-17 RX ADMIN — CHLORHEXIDINE GLUCONATE 1 APPLICATION(S): 213 SOLUTION TOPICAL at 07:02

## 2024-01-17 RX ADMIN — Medication 100 MILLIGRAM(S): at 06:46

## 2024-01-17 RX ADMIN — RISPERIDONE 1 MILLIGRAM(S): 4 TABLET ORAL at 06:46

## 2024-01-17 RX ADMIN — ENOXAPARIN SODIUM 40 MILLIGRAM(S): 100 INJECTION SUBCUTANEOUS at 06:46

## 2024-01-17 RX ADMIN — Medication 100 MILLIGRAM(S): at 14:25

## 2024-01-17 NOTE — BH CONSULTATION LIAISON PROGRESS NOTE - NSBHINDICATION_PSY_ALL_CORE
for psychosis and symptoms of catatonia

## 2024-01-17 NOTE — BH CONSULTATION LIAISON PROGRESS NOTE - NSBHCONSULTMEDANXIETY_PSY_A_CORE FT
Ativan 1mg po q6hrs prn anxiety

## 2024-01-17 NOTE — BH CONSULTATION LIAISON PROGRESS NOTE - NSBHMSEMUSCLE_PSY_A_CORE
Normal muscle tone/strength
Normal muscle tone/strength
Unable to assess
Unable to assess
Normal muscle tone/strength
Normal muscle tone/strength
Unable to assess

## 2024-01-17 NOTE — BH CONSULTATION LIAISON PROGRESS NOTE - NSBHFUPINTERVALCCFT_PSY_A_CORE
"I need a note for my work."
"I'm ok."
"I need a note for my work."
"I just have a cough."

## 2024-01-17 NOTE — DISCHARGE NOTE PROVIDER - ATTENDING DISCHARGE PHYSICAL EXAMINATION:
Vital Signs Last 24 Hrs  T(C): 36.8 (17 Jan 2024 10:29), Max: 36.8 (16 Jan 2024 21:15)  T(F): 98.2 (17 Jan 2024 10:29), Max: 98.3 (17 Jan 2024 05:07)  HR: 110 (17 Jan 2024 10:29) (80 - 110)  BP: 122/83 (17 Jan 2024 10:29) (118/71 - 122/83)  BP(mean): --  RR: 18 (17 Jan 2024 10:29) (18 - 18)  SpO2: 98% (17 Jan 2024 10:29) (98% - 99%)    Parameters below as of 17 Jan 2024 10:29  Patient On (Oxygen Delivery Method): room air        CONSTITUTIONAL: NAD, well-developed, well-groomed  NECK: Supple  RESPIRATORY: Normal respiratory effort; lungs are clear to auscultation bilaterally  CARDIOVASCULAR: Regular rate and rhythm, normal S1 and S2  ABDOMEN: Nontender to palpation, normoactive bowel sounds  PSYCH: A+O to person, place, and time  SKIN: No rashes

## 2024-01-17 NOTE — BH CONSULTATION LIAISON PROGRESS NOTE - NSBHCHARTREVIEWVS_PSY_A_CORE FT
Vital Signs Last 24 Hrs  T(C): 36.9 (13 Jan 2024 12:40), Max: 36.9 (13 Jan 2024 12:40)  T(F): 98.5 (13 Jan 2024 12:40), Max: 98.5 (13 Jan 2024 12:40)  HR: 111 (13 Jan 2024 12:40) (74 - 111)  BP: 111/64 (13 Jan 2024 12:40) (111/64 - 130/79)  BP(mean): --  RR: 18 (13 Jan 2024 12:40) (18 - 18)  SpO2: 96% (13 Jan 2024 12:40) (96% - 96%)    Parameters below as of 13 Jan 2024 12:40  Patient On (Oxygen Delivery Method): room air    
Vital Signs Last 24 Hrs  T(C): 36.3 (12 Jan 2024 12:03), Max: 37.2 (11 Jan 2024 19:36)  T(F): 97.4 (12 Jan 2024 12:03), Max: 98.9 (11 Jan 2024 19:36)  HR: 103 (12 Jan 2024 12:03) (88 - 108)  BP: 115/77 (12 Jan 2024 12:03) (115/77 - 123/74)  BP(mean): --  RR: 18 (12 Jan 2024 12:03) (18 - 18)  SpO2: 97% (12 Jan 2024 12:03) (97% - 98%)    Parameters below as of 12 Jan 2024 12:03  Patient On (Oxygen Delivery Method): room air    
Vital Signs Last 24 Hrs  T(C): 36.8 (17 Jan 2024 10:29), Max: 36.8 (16 Jan 2024 21:15)  T(F): 98.2 (17 Jan 2024 10:29), Max: 98.3 (17 Jan 2024 05:07)  HR: 110 (17 Jan 2024 10:29) (80 - 110)  BP: 122/83 (17 Jan 2024 10:29) (118/71 - 122/83)  BP(mean): --  RR: 18 (17 Jan 2024 10:29) (18 - 18)  SpO2: 98% (17 Jan 2024 10:29) (98% - 99%)    Parameters below as of 17 Jan 2024 10:29  Patient On (Oxygen Delivery Method): room air    
Vital Signs Last 24 Hrs  T(C): 36.9 (14 Jan 2024 11:34), Max: 37 (13 Jan 2024 21:30)  T(F): 98.5 (14 Jan 2024 11:34), Max: 98.6 (13 Jan 2024 21:30)  HR: 102 (14 Jan 2024 11:34) (69 - 111)  BP: 108/65 (14 Jan 2024 11:34) (108/65 - 121/70)  BP(mean): --  RR: 18 (14 Jan 2024 11:34) (18 - 19)  SpO2: 100% (14 Jan 2024 11:34) (96% - 100%)    Parameters below as of 14 Jan 2024 11:34  Patient On (Oxygen Delivery Method): room air    
Vital Signs Last 24 Hrs  T(C): 36.6 (16 Jan 2024 11:23), Max: 36.9 (16 Jan 2024 06:02)  T(F): 97.8 (16 Jan 2024 11:23), Max: 98.4 (16 Jan 2024 06:02)  HR: 88 (16 Jan 2024 11:23) (87 - 99)  BP: 122/78 (16 Jan 2024 11:23) (118/81 - 123/67)  BP(mean): --  RR: 18 (16 Jan 2024 11:23) (18 - 18)  SpO2: 97% (16 Jan 2024 11:23) (97% - 98%)    Parameters below as of 16 Jan 2024 06:02  Patient On (Oxygen Delivery Method): room air    
Vital Signs Last 24 Hrs  T(C): 37.1 (11 Jan 2024 11:46), Max: 37.1 (11 Jan 2024 11:46)  T(F): 98.8 (11 Jan 2024 11:46), Max: 98.8 (11 Jan 2024 11:46)  HR: 78 (11 Jan 2024 11:46) (78 - 98)  BP: 113/74 (11 Jan 2024 11:46) (106/76 - 123/81)  BP(mean): --  RR: 18 (11 Jan 2024 11:46) (18 - 18)  SpO2: 98% (11 Jan 2024 11:46) (98% - 100%)    Parameters below as of 11 Jan 2024 11:46  Patient On (Oxygen Delivery Method): room air    
Vital Signs Last 24 Hrs  T(C): 36.9 (15 Manny 2024 05:50), Max: 37 (14 Jan 2024 21:43)  T(F): 98.4 (15 Manny 2024 05:50), Max: 98.6 (14 Jan 2024 21:43)  HR: 105 (15 Manny 2024 05:50) (88 - 105)  BP: 115/65 (15 Manny 2024 05:50) (108/65 - 137/81)  BP(mean): --  RR: 18 (15 Manny 2024 05:50) (18 - 18)  SpO2: 96% (15 Manny 2024 05:50) (96% - 100%)    Parameters below as of 15 Manny 2024 05:50  Patient On (Oxygen Delivery Method): room air

## 2024-01-17 NOTE — BH CONSULTATION LIAISON PROGRESS NOTE - NSICDXBHSECONDARYDX_PSY_ALL_CORE
Psychosis, unspecified psychosis type   F29  Psychosis   F29  

## 2024-01-17 NOTE — BH CONSULTATION LIAISON PROGRESS NOTE - NSBHMSEGAIT_PSY_A_CORE
Unable to assess
Normal gait / station
Normal gait / station
Unable to assess
Normal gait / station
Unable to assess
Normal gait / station

## 2024-01-17 NOTE — DISCHARGE NOTE PROVIDER - NSDCCPCAREPLAN_GEN_ALL_CORE_FT
PRINCIPAL DISCHARGE DIAGNOSIS  Diagnosis: Major depressive disorder, single episode with catatonic features  Assessment and Plan of Treatment: SEEK IMMEDIATE CARE IF  You have thoughts about hurting yourself or others.  You lose touch with reality (have psychotic symptoms).  You are taking medicine for depression and have a serious side effect  follow up with psychiatry at outpt psych clinic [place]  continue ativan and risperidone as prescribed         SECONDARY DISCHARGE DIAGNOSES  Diagnosis: 2019 novel coronavirus disease (COVID-19)  Assessment and Plan of Treatment: You have tested POSITIVE for the novel coronavirus (COVID-19).   You no longer require hospitalization.  Follow up with your doctor within1 week.  - Take Tylenol (Acetaminophen) 650mg as needed for pain or fever  .  - Stay well hydrated.   - Isolate yourself, stay home for at least 5 days and until you are fever free for 24 hours and symptoms are improving followed by 5 days of wearing a mask when around others.  - Wash hands frequently in warm soapy water for at least 20 seconds.  - Protect those you live with by staying 6 feet away when you are in the same room, wearing a mask, washing hands frequently, coughing and sneezing into as elbow, or a tissue.  - Notify your household and close contacts to immediately quarantine.  - Avoid contact with anybody who is sick OR at risk populations including elderly, young, pregnant patients, immune compromised people.  SEEK IMMEDIATE MEDICAL CARE IF YOU HAVE ANY OF THE FOLLOWING SYMPTOMS:  for any new or worsening symptoms including but not limited to difficulty breathing, severe weakness, confusion, chest pain, fever over 10 days, or lightheadedness/dizziness.  if symptoms return or worsen, or if SOB, contact provider immediately

## 2024-01-17 NOTE — BH CONSULTATION LIAISON PROGRESS NOTE - NSBHPTASSESSDT_PSY_A_CORE
13-Jan-2024 14:18
11-Jan-2024 16:44
17-Jan-2024 15:17
12-Jan-2024 13:35
16-Jan-2024 16:57
15-Manny-2024 09:56
14-Jan-2024 11:39

## 2024-01-17 NOTE — BH CONSULTATION LIAISON PROGRESS NOTE - NSBHFUPREASONCONS_PSY_A_CORE
psychosis/med management
psychosis/med management
psychosis
psychosis/med management
psychosis/med management

## 2024-01-17 NOTE — BH CONSULTATION LIAISON PROGRESS NOTE - NSBHCONSULTWORKUPLABSFT_PSY_ALL_CORE
as per medical team (B12, Folate, TSH, RPR)

## 2024-01-17 NOTE — DISCHARGE NOTE PROVIDER - NSFOLLOWUPCLINICS_GEN_ALL_ED_FT
[facility]  Psychiatry  [address] [place] [address]  Phone: [phone]  Fax:

## 2024-01-17 NOTE — BH CONSULTATION LIAISON PROGRESS NOTE - NSBHFUPINTERVALHXFT_PSY_A_CORE
pt denies complaints, denies depression, psychosis, autumn, anxiety. pt states she feels fine, denies si/ih. pt ate breakfast today, no agitation. 
Pt's aunt is very concerned that pt has been off of psychotropic meds (Risperidone) and says she has become increasingly withdrawal and appears catatonic.  Pt denies these symptoms but agrees she may benefit from restarting the Risperidone.  Risperidone 0.5mg po BID and Ativan 0.5mg po BID were started, and her Risperidone will be increased to 1mg po BID
Pt's aunt is very concerned that pt has been off of psychotropic meds (Risperidone) and says she has become increasingly withdrawal and appears catatonic.  Pt denies these symptoms but agrees she may benefit from restarting the Risperidone.  Risperidone 0.5mg po BID and Ativan 0.5mg po BID were started, and her Risperidone was increased to 1mg po BID.
pt denies complaints, denies depression, psychosis, autumn, anxiety. pt states she feels fine, denies si/hi. sleep and appetite fair. compliant with treatment.
pt denies complaints, denies depression, psychosis, autumn, anxiety. pt states she feels fine, denies si/hi. sleep and appetite fair. compliant with treatment.
Pt's aunt is very concerned that pt has been off of psychotropic meds (Risperidone) and says she has become increasingly withdrawal and appears catatonic.  Pt denies these symptoms but agrees she may benefit from restarting the Risperidone.  Risperidone 0.5mg po BID and Ativan 0.5mg po BID were started. 
pt denies complaints, denies depression, psychosis, autumn, anxiety. pt states she feels fine, denies si/hi. sleep and appetite fair. compliant with treatment.

## 2024-01-17 NOTE — BH CONSULTATION LIAISON PROGRESS NOTE - NSBHASSESSMENTFT_PSY_ALL_CORE
Pt is a 23 year old with history of psychiatric admission for a prior history of psychosis now BIB aunt for fatigue. Patient has tested Covid +, she feels fatigued and irritation of her stomach. She states her aunt, with whom she lives, became concerned that she is getting depressed because she has been sleeping a lot. Patient denies feeling depressed. She used to see a therapist and has taken anxiolytic medications in the past which she believes helped but is no longer seeing therapist or taking medications due to insurance issues. Pt states she is sleeping 7-8 hours a day, works as a  4pm-11pm, worked yesterday and plans to go back tomorrow. She denies history of SI or HI.  When she is not working, she spends time with friends about once a month, other times she stays home watching tv. Denies recent anxiety attacks, last attack was a few months ago when she had an argument with her ex boyfriend. She denies trouble with the law. Denies alcohol or drug abuse.  PT may be observed for symptoms of catatonia though currently denies.  She admits to having GI symptoms preventing her from eating.   Discussed pt with her aunt who is concerned that pt has developed worsening of her symptoms of psychosis and will need admission to psychiatry once she is Covid negative. discussed with ED staff who may keep her in the ED though pt may not be eligible for psychiatric admission until she tests negative.  Pt's aunt is very concerned that pt has been off of psychotropic meds (Risperidone) and says she has become increasingly withdrawal and appears catatonic.  Pt denies these symptoms but agrees she may benefit from restarting the Risperidone.  Risperidone 0.5mg po BID and Ativan 0.5mg po BID were started.   Increased Risperidone to 1mg po BID  Changed Ativan to 1mg po qhs   Pt may require inpatient psychiatric are To continue adjusting her Risperidone if she is not responsive to her current doses  
Pt is a 23 year old with history of psychiatric admission for a prior history of psychosis now BIB aunt for fatigue. Patient has tested Covid +, she feels fatigued and irritation of her stomach. She states her aunt, with whom she lives, became concerned that she is getting depressed because she has been sleeping a lot. Patient denies feeling depressed. She used to see a therapist and has taken anxiolytic medications in the past which she believes helped but is no longer seeing therapist or taking medications due to insurance issues. Pt states she is sleeping 7-8 hours a day, works as a  4pm-11pm, worked yesterday and plans to go back tomorrow. She denies history of SI or HI.  When she is not working, she spends time with friends about once a month, other times she stays home watching tv. Denies recent anxiety attacks, last attack was a few months ago when she had an argument with her ex boyfriend. She denies trouble with the law. Denies alcohol or drug abuse.  PT may be observed for symptoms of catatonia though currently denies.  She admits to having GI symptoms preventing her from eating.   Discussed pt with her aunt who is concerned that pt has developed worsening of her symptoms of psychosis and will need admission to psychiatry once she is Covid negative. discussed with ED staff who may keep her in the ED though pt may not be eligible for psychiatric admission until she tests negative.  Pt's aunt is very concerned that pt has been off of psychotropic meds (Risperidone) and says she has become increasingly withdrawal and appears catatonic.  Pt denies these symptoms but agrees she may benefit from restarting the Risperidone.  Risperidone 0.5mg po BID and Ativan 0.5mg po BID were started.   continue Risperidone  1 mg po BID  continue Ativan 1mg po qhs   Pt may require inpatient psychiatric are To continue adjusting her Risperidone if she is not responsive to her current doses  
Pt is a 23 year old with history of psychiatric admission for a prior history of psychosis now BIB aunt for fatigue. Patient has tested Covid +, she feels fatigued and irritation of her stomach. She states her aunt, with whom she lives, became concerned that she is getting depressed because she has been sleeping a lot. Patient denies feeling depressed. She used to see a therapist and has taken anxiolytic medications in the past which she believes helped but is no longer seeing therapist or taking medications due to insurance issues. Pt states she is sleeping 7-8 hours a day, works as a  4pm-11pm, worked yesterday and plans to go back tomorrow. She denies history of SI or HI.  When she is not working, she spends time with friends about once a month, other times she stays home watching tv. Denies recent anxiety attacks, last attack was a few months ago when she had an argument with her ex boyfriend. She denies trouble with the law. Denies alcohol or drug abuse.  PT may be observed for symptoms of catatonia though currently denies.  She admits to having GI symptoms preventing her from eating.   Discussed pt with her aunt who is concerned that pt has developed worsening of her symptoms of psychosis and will need admission to psychiatry once she is Covid negative. discussed with ED staff who may keep her in the ED though pt may not be eligible for psychiatric admission until she tests negative.  Pt's aunt is very concerned that pt has been off of psychotropic meds (Risperidone) and says she has become increasingly withdrawal and appears catatonic.  Pt denies these symptoms but agrees she may benefit from restarting the Risperidone.  Risperidone 0.5mg po BID and Ativan 0.5mg po BID were started.   continue Risperidone  1 mg po BID  continue Ativan 1mg po qhs   Pt may require inpatient psychiatric are To continue adjusting her Risperidone if she is not responsive to her current doses  
Pt is a 23 year old with history of psychiatric admission for a prior history of psychosis now BIB aunt for fatigue. Patient has tested Covid +, she feels fatigued and irritation of her stomach. She states her aunt, with whom she lives, became concerned that she is getting depressed because she has been sleeping a lot. Patient denies feeling depressed. She used to see a therapist and has taken anxiolytic medications in the past which she believes helped but is no longer seeing therapist or taking medications due to insurance issues. Pt states she is sleeping 7-8 hours a day, works as a  4pm-11pm, worked yesterday and plans to go back tomorrow. She denies history of SI or HI.  When she is not working, she spends time with friends about once a month, other times she stays home watching tv. Denies recent anxiety attacks, last attack was a few months ago when she had an argument with her ex boyfriend. She denies trouble with the law. Denies alcohol or drug abuse.  PT may be observed for symptoms of catatonia though currently denies.  She admits to having GI symptoms preventing her from eating.   Discussed pt with her aunt who is concerned that pt has developed worsening of her symptoms of psychosis and will need admission to psychiatry once she is Covid negative. discussed with ED staff who may keep her in the ED though pt may not be eligible for psychiatric admission until she tests negative.  Pt's aunt is very concerned that pt has been off of psychotropic meds (Risperidone) and says she has become increasingly withdrawal and appears catatonic.  Pt denies these symptoms but agrees she may benefit from restarting the Risperidone.  Risperidone 0.5mg po BID and Ativan 0.5mg po BID were started.   Increased Risperidone to 1mg po BID  Changed Ativan to 1mg po qhs   Pt may require inpatient psychiatric are To continue adjusting her Risperidone if she is not responsive to her current doses  
Pt is a 23 year old with history of psychiatric admission for a prior history of psychosis now BIB aunt for fatigue. Patient has tested Covid +, she feels fatigued and irritation of her stomach. She states her aunt, with whom she lives, became concerned that she is getting depressed because she has been sleeping a lot. Patient denies feeling depressed. She used to see a therapist and has taken anxiolytic medications in the past which she believes helped but is no longer seeing therapist or taking medications due to insurance issues. Pt states she is sleeping 7-8 hours a day, works as a  4pm-11pm, worked yesterday and plans to go back tomorrow. She denies history of SI or HI.  When she is not working, she spends time with friends about once a month, other times she stays home watching tv. Denies recent anxiety attacks, last attack was a few months ago when she had an argument with her ex boyfriend. She denies trouble with the law. Denies alcohol or drug abuse.  PT may be observed for symptoms of catatonia though currently denies.  She admits to having GI symptoms preventing her from eating.   Discussed pt with her aunt who is concerned that pt has developed worsening of her symptoms of psychosis and will need admission to psychiatry once she is Covid negative. discussed with ED staff who may keep her in the ED though pt may not be eligible for psychiatric admission until she tests negative.  Pt's aunt is very concerned that pt has been off of psychotropic meds (Risperidone) and says she has become increasingly withdrawal and appears catatonic.  Pt denies these symptoms but agrees she may benefit from restarting the Risperidone.  Risperidone 0.5mg po BID and Ativan 0.5mg po BID were started.   continue Risperidone  1 mg po BID  continue Ativan 1mg po qhs   Pt may require inpatient psychiatric are To continue adjusting her Risperidone if she is not responsive to her current doses  
Pt is a 23 year old with history of psychiatric admission for a prior history of psychosis now BIB aunt for fatigue. Patient has tested Covid +, she feels fatigued and irritation of her stomach. She states her aunt, with whom she lives, became concerned that she is getting depressed because she has been sleeping a lot. Patient denies feeling depressed. She used to see a therapist and has taken anxiolytic medications in the past which she believes helped but is no longer seeing therapist or taking medications due to insurance issues. Pt states she is sleeping 7-8 hours a day, works as a  4pm-11pm, worked yesterday and plans to go back tomorrow. She denies history of SI or HI.  When she is not working, she spends time with friends about once a month, other times she stays home watching tv. Denies recent anxiety attacks, last attack was a few months ago when she had an argument with her ex boyfriend. She denies trouble with the law. Denies alcohol or drug abuse.  PT may be observed for symptoms of catatonia though currently denies.  She admits to having GI symptoms preventing her from eating.   Discussed pt with her aunt who is concerned that pt has developed worsening of her symptoms of psychosis and will need admission to psychiatry once she is Covid negative. discussed with ED staff who may keep her in the ED though pt may not be eligible for psychiatric admission until she tests negative.  Pt's aunt is very concerned that pt has been off of psychotropic meds (Risperidone) and says she has become increasingly withdrawal and appears catatonic.  Pt denies these symptoms but agrees she may benefit from restarting the Risperidone.  Risperidone 0.5mg po BID and Ativan 0.5mg po BID were started. 
Pt is a 23 year old with history of psychiatric admission for a prior history of psychosis now BIB aunt for fatigue. Patient has tested Covid +, she feels fatigued and irritation of her stomach. She states her aunt, with whom she lives, became concerned that she is getting depressed because she has been sleeping a lot. Patient denies feeling depressed. She used to see a therapist and has taken anxiolytic medications in the past which she believes helped but is no longer seeing therapist or taking medications due to insurance issues. Pt states she is sleeping 7-8 hours a day, works as a  4pm-11pm, worked yesterday and plans to go back tomorrow. She denies history of SI or HI.  When she is not working, she spends time with friends about once a month, other times she stays home watching tv. Denies recent anxiety attacks, last attack was a few months ago when she had an argument with her ex boyfriend. She denies trouble with the law. Denies alcohol or drug abuse.  PT may be observed for symptoms of catatonia though currently denies.  She admits to having GI symptoms preventing her from eating.   Discussed pt with her aunt who is concerned that pt has developed worsening of her symptoms of psychosis and will need admission to psychiatry once she is Covid negative. discussed with ED staff who may keep her in the ED though pt may not be eligible for psychiatric admission until she tests negative.  Pt's aunt is very concerned that pt has been off of psychotropic meds (Risperidone) and says she has become increasingly withdrawal and appears catatonic.  Pt denies these symptoms but agrees she may benefit from restarting the Risperidone.  Risperidone 0.5mg po BID and Ativan 0.5mg po BID were started.   Increased Risperidone to 1mg po BID  Changed Ativan to 1mg po qhs   Pt may require inpatient psychiatric are To continue adjusting her Risperidone if she is not responsive to her current doses

## 2024-01-17 NOTE — DISCHARGE NOTE NURSING/CASE MANAGEMENT/SOCIAL WORK - NSDCFUADDAPPT_GEN_ALL_CORE_FT
APPTS ARE READY TO BE MADE: [X] YES    Best Family or Patient Contact (if needed):    Additional Information about above appointments (if needed):    1: Follow up with PCP as outpatient  2: [facility]- mental health outpatient clinic at [address], [place] [address] [phone]. Call to arrange       Other comments or requests:

## 2024-01-17 NOTE — DISCHARGE NOTE NURSING/CASE MANAGEMENT/SOCIAL WORK - PATIENT PORTAL LINK FT
You can access the FollowMyHealth Patient Portal offered by [facility] by registering at the following website: [email or web address]. By joining [email or web address] FollowMyHealth portal, you will also be able to view your health information using other applications (apps) compatible with our system.

## 2024-01-17 NOTE — BH CONSULTATION LIAISON PROGRESS NOTE - NSBHCONSFOLLOWNEEDS_PSY_ALL_CORE
Needs further psych safety assessment prior to discharge
No psychiatric contraindications to discharge
No psychiatric contraindications to discharge
Needs further psych safety assessment prior to discharge
Needs further psych safety assessment prior to discharge

## 2024-01-17 NOTE — DISCHARGE NOTE PROVIDER - NSDCFUADDINST_GEN_ALL_CORE_FT
Follow up with: [facility]- mental health outpatient clinic at [address], [place] [address] [phone]. Call to arrange

## 2024-01-17 NOTE — BH CONSULTATION LIAISON PROGRESS NOTE - NSBHCONSULTFOLLOWAFTERCARE_PSY_A_CORE FT
Pt may go home with her aunt and have follow up at the [facility] [phone] or [facility] [phone], H walk in clinic [phone] (aunt has all three numbers) so that pt can see a psychiatrist there who can prescribe her Risperidone. 1mg BID

## 2024-01-17 NOTE — BH CONSULTATION LIAISON PROGRESS NOTE - NSBHMSEAFFRANGE_PSY_A_CORE
Full/Constricted
Constricted
Full/Constricted
Constricted
Constricted

## 2024-01-17 NOTE — BH CONSULTATION LIAISON PROGRESS NOTE - CURRENT MEDICATION
MEDICATIONS  (STANDING):  benzonatate 100 milliGRAM(s) Oral every 8 hours  chlorhexidine 2% Cloths 1 Application(s) Topical <User Schedule>  enoxaparin Injectable 40 milliGRAM(s) SubCutaneous every 24 hours  LORazepam     Tablet 1 milliGRAM(s) Oral at bedtime  risperiDONE   Tablet 1 milliGRAM(s) Oral two times a day    MEDICATIONS  (PRN):  melatonin 3 milliGRAM(s) Oral at bedtime PRN Insomnia  
MEDICATIONS  (STANDING):  chlorhexidine 2% Cloths 1 Application(s) Topical <User Schedule>  enoxaparin Injectable 40 milliGRAM(s) SubCutaneous every 24 hours  LORazepam     Tablet 1 milliGRAM(s) Oral at bedtime  risperiDONE   Tablet 1 milliGRAM(s) Oral two times a day    MEDICATIONS  (PRN):  melatonin 3 milliGRAM(s) Oral at bedtime PRN Insomnia  
MEDICATIONS  (STANDING):  benzonatate 100 milliGRAM(s) Oral every 8 hours  chlorhexidine 2% Cloths 1 Application(s) Topical <User Schedule>  enoxaparin Injectable 40 milliGRAM(s) SubCutaneous every 24 hours  LORazepam     Tablet 1 milliGRAM(s) Oral at bedtime  risperiDONE   Tablet 1 milliGRAM(s) Oral two times a day    MEDICATIONS  (PRN):  melatonin 3 milliGRAM(s) Oral at bedtime PRN Insomnia  
MEDICATIONS  (STANDING):  chlorhexidine 2% Cloths 1 Application(s) Topical <User Schedule>  enoxaparin Injectable 40 milliGRAM(s) SubCutaneous every 24 hours  LORazepam     Tablet 0.5 milliGRAM(s) Oral two times a day  risperiDONE   Tablet 0.5 milliGRAM(s) Oral two times a day    MEDICATIONS  (PRN):  LORazepam     Tablet 1 milliGRAM(s) Oral once PRN Agitation  melatonin 3 milliGRAM(s) Oral at bedtime PRN Insomnia  
MEDICATIONS  (STANDING):  benzonatate 100 milliGRAM(s) Oral every 8 hours  chlorhexidine 2% Cloths 1 Application(s) Topical <User Schedule>  enoxaparin Injectable 40 milliGRAM(s) SubCutaneous every 24 hours  LORazepam     Tablet 1 milliGRAM(s) Oral at bedtime  risperiDONE   Tablet 1 milliGRAM(s) Oral two times a day    MEDICATIONS  (PRN):  melatonin 3 milliGRAM(s) Oral at bedtime PRN Insomnia  
MEDICATIONS  (STANDING):  chlorhexidine 2% Cloths 1 Application(s) Topical <User Schedule>  enoxaparin Injectable 40 milliGRAM(s) SubCutaneous every 24 hours  LORazepam     Tablet 1 milliGRAM(s) Oral at bedtime  risperiDONE   Tablet 1 milliGRAM(s) Oral two times a day    MEDICATIONS  (PRN):  LORazepam     Tablet 1 milliGRAM(s) Oral once PRN Agitation  melatonin 3 milliGRAM(s) Oral at bedtime PRN Insomnia  
MEDICATIONS  (STANDING):  chlorhexidine 2% Cloths 1 Application(s) Topical <User Schedule>  enoxaparin Injectable 40 milliGRAM(s) SubCutaneous every 24 hours  LORazepam     Tablet 0.5 milliGRAM(s) Oral two times a day  risperiDONE   Tablet 0.5 milliGRAM(s) Oral two times a day    MEDICATIONS  (PRN):  LORazepam     Tablet 1 milliGRAM(s) Oral once PRN Agitation  melatonin 3 milliGRAM(s) Oral at bedtime PRN Insomnia

## 2024-01-17 NOTE — DISCHARGE NOTE PROVIDER - NSDCFUADDAPPT_GEN_ALL_CORE_FT
APPTS ARE READY TO BE MADE: [X] YES    Best Family or Patient Contact (if needed):    Additional Information about above appointments (if needed):    1: Follow up with PCP as outpatient  2: [facility]- mental health outpatient clinic at [phone], [place] [address] [phone]. Call to arrange       Other comments or requests:    APPTS ARE READY TO BE MADE: [X] YES    Best Family or Patient Contact (if needed):    Additional Information about above appointments (if needed):    1: Follow up with PCP as outpatient  2: Follow up with outpt psych clinic [place]: [facility]- mental health outpatient clinic at [address], [place] [address] [phone]. Call to arrange       Other comments or requests:     Provided patient with provider referral information, however patient prefers to schedule the appointments on their own.

## 2024-01-17 NOTE — DISCHARGE NOTE PROVIDER - NSDCMRMEDTOKEN_GEN_ALL_CORE_FT
Ativan 1 mg oral tablet: 1 tab(s) orally 2 times a day MDD: MDD: 2 tabs daily  Ativan 1 mg oral tablet: 1 tab(s) orally 2 times a day MDD: MDD: 2 tabs daily  risperiDONE 1 mg oral tablet: 1 tab(s) orally 2 times a day   Ativan 1 mg oral tablet: 1 tab(s) orally 2 times a day MDD: MDD: 2 tabs daily  risperiDONE 1 mg oral tablet: 1 tab(s) orally 2 times a day

## 2024-01-17 NOTE — DISCHARGE NOTE PROVIDER - HOSPITAL COURSE
HPI:  Patient declines to verbalize, history obtained from discussion with ER providers and reviewing psych as well as ER notes. 23F w/ hx of psychiatric admission for anxiety BIB aunt for fatigue. Patient has tested Covid +, she feels fatigued and irritation of her stomach. She states her aunt, with whom she lives, became concerned that she is getting depressed because she has been sleeping a lot. Patient denies feeling depressed. She used to see a therapist and has taken anxiolytic medications in the past which she believes helped but is no longer seeing therapist or taking medications due to insurance issues. Pt states she is sleeping 7-8 hours a day, works as a  4pm-11pm, worked yesterday. Based on conversation between ER and Psych it was felt patient should likely go to inpatient psych but currently not candidate due to COVID. Denied SI and HI to multiple providers.    Hospital Course:  Pt presented with increased depression, and with single episode with catatonic features. THC positive on admission. Catatonia seen improving, pt alert and oriented x3 and conversational. Evaluated by psychiatry inpt and recommended for ativan 1 mg BID and risperidone 1 mg BID. Pt to continue upon DC as prescribed and follow up with outpt psychiatry clinic. Pt found covid 19 positive, no increased O2 needs, saturating on room air. Normal respiratory exam, not meeting crieria for remdesivir or dexamethasone. On isolation precautions inpatient. Pt complained of pyuria, UA contaminated, pt with no UTI symptoms, pt monitored off antibiotics. Pt to follow up with PCP as outpatient.     Important Medication Changes and Reason:  - Ordered for risperidone 1 mg BID  -Ordered for ativan 1 mg BID     Active or Pending Issues Requiring Follow-up:  - Follow up with outpatient psychiatry clinic [place]   -Follow up with PCP outpatient    Advanced Directives:   [X] Full code  [ ] DNR  [ ] Hospice    Discharge Diagnoses:  - Major depressive disorder  -Covid 19  -UTI      Discharge/Dispo/Med rec discussed with attending Dr. Osei and Dr. Salgado. Patient medically cleared for discharge Home with outpatient follow up with PCP and psychiatry                HPI:  Patient declines to verbalize, history obtained from discussion with ER providers and reviewing psych as well as ER notes. 23F w/ hx of psychiatric admission for anxiety BIB aunt for fatigue. Patient has tested Covid +, she feels fatigued and irritation of her stomach. She states her aunt, with whom she lives, became concerned that she is getting depressed because she has been sleeping a lot. Patient denies feeling depressed. She used to see a therapist and has taken anxiolytic medications in the past which she believes helped but is no longer seeing therapist or taking medications due to insurance issues. Pt states she is sleeping 7-8 hours a day, works as a  4pm-11pm, worked yesterday. Based on conversation between ER and Psych it was felt patient should likely go to inpatient psych but currently not candidate due to COVID. Denied SI and HI to multiple providers.    Hospital Course:  Pt presented with increased depression, and with single episode with catatonic features. THC positive on admission. Catatonia seen improving, pt alert and oriented x3 and conversational. Evaluated by psychiatry inpt and recommended for ativan 1 mg BID and risperidone 1 mg BID. Pt to continue upon DC as prescribed and follow up with outpt psychiatry clinic. Pt found covid 19 positive, no increased O2 needs, saturating on room air. Normal respiratory exam, not meeting crieria for remdesivir or dexamethasone. On isolation precautions inpatient. Pt complained of pyuria, UA contaminated, pt with no UTI symptoms, pt monitored off antibiotics. Pt to follow up with PCP as outpatient.     Important Medication Changes and Reason:  - Ordered for risperidone 1 mg BID  -Ordered for ativan 1 mg BID     Active or Pending Issues Requiring Follow-up:  - Follow up with outpatient psychiatry clinic [place]   -Follow up with PCP outpatient    Advanced Directives:   [X] Full code  [ ] DNR  [ ] Hospice    Discharge Diagnoses:  - Major depressive disorder  -Covid 19      Discharge/Dispo/Med rec discussed with attending Dr. Osei and Dr. Salgado. Patient medically cleared for discharge Home with outpatient follow up with PCP and psychiatry

## 2024-01-17 NOTE — BH CONSULTATION LIAISON PROGRESS NOTE - NSBHMSESPEECH_PSY_A_CORE
Normal volume, rate, productivity, spontaneity and articulation
Abnormal as indicated, otherwise normal...
Abnormal as indicated, otherwise normal...
Normal volume, rate, productivity, spontaneity and articulation
Normal volume, rate, productivity, spontaneity and articulation
Abnormal as indicated, otherwise normal...
Normal volume, rate, productivity, spontaneity and articulation